# Patient Record
Sex: MALE | Race: WHITE | NOT HISPANIC OR LATINO | Employment: FULL TIME | URBAN - METROPOLITAN AREA
[De-identification: names, ages, dates, MRNs, and addresses within clinical notes are randomized per-mention and may not be internally consistent; named-entity substitution may affect disease eponyms.]

---

## 2018-04-02 ENCOUNTER — APPOINTMENT (OUTPATIENT)
Dept: RADIOLOGY | Facility: MEDICAL CENTER | Age: 73
End: 2018-04-02
Attending: EMERGENCY MEDICINE
Payer: MEDICARE

## 2018-04-02 ENCOUNTER — HOSPITAL ENCOUNTER (EMERGENCY)
Facility: MEDICAL CENTER | Age: 73
End: 2018-04-02
Attending: EMERGENCY MEDICINE
Payer: MEDICARE

## 2018-04-02 VITALS
HEART RATE: 92 BPM | OXYGEN SATURATION: 98 % | WEIGHT: 214.95 LBS | SYSTOLIC BLOOD PRESSURE: 149 MMHG | BODY MASS INDEX: 26.18 KG/M2 | HEIGHT: 76 IN | TEMPERATURE: 99.3 F | RESPIRATION RATE: 18 BRPM | DIASTOLIC BLOOD PRESSURE: 73 MMHG

## 2018-04-02 DIAGNOSIS — J40 BRONCHITIS: ICD-10-CM

## 2018-04-02 LAB
ALBUMIN SERPL BCP-MCNC: 4.4 G/DL (ref 3.2–4.9)
ALBUMIN/GLOB SERPL: 1.4 G/DL
ALP SERPL-CCNC: 52 U/L (ref 30–99)
ALT SERPL-CCNC: 29 U/L (ref 2–50)
ANION GAP SERPL CALC-SCNC: 9 MMOL/L (ref 0–11.9)
AST SERPL-CCNC: 31 U/L (ref 12–45)
BILIRUB SERPL-MCNC: 1.1 MG/DL (ref 0.1–1.5)
BNP SERPL-MCNC: 46 PG/ML (ref 0–100)
BUN SERPL-MCNC: 24 MG/DL (ref 8–22)
CALCIUM SERPL-MCNC: 9.6 MG/DL (ref 8.4–10.2)
CHLORIDE SERPL-SCNC: 104 MMOL/L (ref 96–112)
CO2 SERPL-SCNC: 24 MMOL/L (ref 20–33)
CREAT SERPL-MCNC: 1.18 MG/DL (ref 0.5–1.4)
FLUAV RNA SPEC QL NAA+PROBE: NEGATIVE
FLUAV+FLUBV AG SPEC QL IA: NORMAL
FLUBV RNA SPEC QL NAA+PROBE: NEGATIVE
GLOBULIN SER CALC-MCNC: 3.1 G/DL (ref 1.9–3.5)
GLUCOSE SERPL-MCNC: 171 MG/DL (ref 65–99)
INR PPP: 1.1 (ref 0.87–1.13)
LACTATE BLD-SCNC: 0.77 MMOL/L (ref 0.5–2)
POTASSIUM SERPL-SCNC: 4 MMOL/L (ref 3.6–5.5)
PROT SERPL-MCNC: 7.5 G/DL (ref 6–8.2)
PROTHROMBIN TIME: 14.1 SEC (ref 12–14.6)
SIGNIFICANT IND 70042: NORMAL
SITE SITE: NORMAL
SODIUM SERPL-SCNC: 137 MMOL/L (ref 135–145)
SOURCE SOURCE: NORMAL
TROPONIN I SERPL-MCNC: <0.02 NG/ML (ref 0–0.04)

## 2018-04-02 PROCEDURE — 80053 COMPREHEN METABOLIC PANEL: CPT

## 2018-04-02 PROCEDURE — 36415 COLL VENOUS BLD VENIPUNCTURE: CPT

## 2018-04-02 PROCEDURE — 83880 ASSAY OF NATRIURETIC PEPTIDE: CPT

## 2018-04-02 PROCEDURE — 71045 X-RAY EXAM CHEST 1 VIEW: CPT

## 2018-04-02 PROCEDURE — 85610 PROTHROMBIN TIME: CPT

## 2018-04-02 PROCEDURE — 84484 ASSAY OF TROPONIN QUANT: CPT

## 2018-04-02 PROCEDURE — 87400 INFLUENZA A/B EACH AG IA: CPT

## 2018-04-02 PROCEDURE — 83605 ASSAY OF LACTIC ACID: CPT

## 2018-04-02 PROCEDURE — 87502 INFLUENZA DNA AMP PROBE: CPT

## 2018-04-02 PROCEDURE — 700105 HCHG RX REV CODE 258: Performed by: EMERGENCY MEDICINE

## 2018-04-02 PROCEDURE — 99284 EMERGENCY DEPT VISIT MOD MDM: CPT

## 2018-04-02 RX ORDER — ATORVASTATIN CALCIUM 20 MG/1
20 TABLET, FILM COATED ORAL
COMMUNITY

## 2018-04-02 RX ORDER — ALLOPURINOL 100 MG/1
100 TABLET ORAL 2 TIMES DAILY
COMMUNITY

## 2018-04-02 RX ORDER — GABAPENTIN 300 MG/1
300 CAPSULE ORAL 2 TIMES DAILY
COMMUNITY

## 2018-04-02 RX ORDER — CHLORAL HYDRATE 500 MG
2000 CAPSULE ORAL DAILY
Status: SHIPPED | COMMUNITY
End: 2021-08-17

## 2018-04-02 RX ORDER — IRBESARTAN 300 MG/1
300 TABLET ORAL DAILY
Status: SHIPPED | COMMUNITY
End: 2021-08-17

## 2018-04-02 RX ORDER — SODIUM CHLORIDE 9 MG/ML
1000 INJECTION, SOLUTION INTRAVENOUS ONCE
Status: COMPLETED | OUTPATIENT
Start: 2018-04-02 | End: 2018-04-02

## 2018-04-02 RX ORDER — AZITHROMYCIN 250 MG/1
TABLET, FILM COATED ORAL
Qty: 6 TAB | Refills: 0 | Status: SHIPPED | OUTPATIENT
Start: 2018-04-02 | End: 2019-06-21

## 2018-04-02 RX ORDER — M-VIT,TX,IRON,MINS/CALC/FOLIC 27MG-0.4MG
1 TABLET ORAL DAILY
COMMUNITY

## 2018-04-02 RX ADMIN — SODIUM CHLORIDE 1000 ML: 9 INJECTION, SOLUTION INTRAVENOUS at 11:05

## 2018-04-02 ASSESSMENT — PAIN SCALES - GENERAL
PAINLEVEL_OUTOF10: 0
PAINLEVEL_OUTOF10: 0

## 2018-04-02 NOTE — ED PROVIDER NOTES
"ED Provider Note    CHIEF COMPLAINT  Chief Complaint   Patient presents with   • Cough     since Fri Room mate also ill  Prod yellow to green sputum   • Fever     Low grade       HPI  Khadar Us is a 72 y.o. male who presents to the emergency department with a cough. The patient's been sick since Friday. He states his roommate is ill with a similar illness. The patient's had low-grade fevers as well as a productive cough. He's also had periodic fevers and chills. He has not had any vomiting. He does have some generalized malaise. He also has some congestion.    REVIEW OF SYSTEMS  See HPI for further details. All other systems are negative.     PAST MEDICAL HISTORY  Past Medical History:   Diagnosis Date   • Skull fracture (CMS-[facility])        SOCIAL HISTORY  Social History     Social History   • Marital status: Single     Spouse name: N/A   • Number of children: N/A   • Years of education: N/A     Social History Main Topics   • Smoking status: Former Smoker   • Smokeless tobacco: Never Used   • Alcohol use Yes      Comment: 1 beer /day   • Drug use: No   • Sexual activity: Not on file     Other Topics Concern   • Not on file     Social History Narrative   • No narrative on file           PHYSICAL EXAM  VITAL SIGNS: /73   Pulse 90   Temp 37.4 °C (99.3 °F)   Resp 13   Ht 1.93 m (6' 4\")   Wt 97.5 kg (214 lb 15.2 oz)   SpO2 96%   BMI 26.16 kg/m²   Constitutional: Well developed, Well nourished, No acute distress, Non-toxic appearance.   HENT: Normocephalic, Atraumatic, tympanic membranes are intact and nonerythematous bilaterally, Oropharynx moist without exudates or erythema, Nose swollen turbinates  Eyes: PERRLA, EOMI, Conjunctiva normal.  Neck: Supple without meningismus  Lymphatic: No lymphadenopathy noted.   Cardiovascular: Normal heart rate, Normal rhythm, No murmurs, No rubs, No gallops.   Thorax & Lungs: Mild diffuse rhonchi, No respiratory distress, No wheezing, No chest tenderness. "   Abdomen: Bowel sounds normal, Soft, No tenderness, no rebound, no guarding, no distention, No masses, No pulsatile masses.   Skin: Warm, Dry, No erythema, No rash.   Back: No tenderness, No CVA tenderness.   Extremities: Atraumatic with symmetric distal pulses, No edema, No tenderness, No cyanosis, No clubbing.   Neurologic: Alert & oriented x 3, cranial nerves II through XII are intact, Normal motor function, Normal sensory function, No focal deficits noted.   Psychiatric: Affect normal, Judgment normal, Mood normal.     RADIOLOGY/PROCEDURES  DX-CHEST-PORTABLE (1 VIEW)   Final Result      No evidence of acute cardiopulmonary process.            COURSE & MEDICAL DECISION MAKING  Pertinent Labs & Imaging studies reviewed. (See chart for details)  This a 72-year-old gentleman who presents the first part with suspected acute bronchitis. Of note the patient was initially triaged by my partner. Due to the diarrhea there concern for possible dehydration the patient did receive intravenous fluids. This will help with elevation and his BUN which does support mild dehydration. The chest x-ray does not show any evidence for focal process such as pneumonia. The patient does not have a lactic acidosis. The patient's platelets quite related and therefore they asked for a redraw on the CBC however due to the patient's appearance and the rest of his blood work I canceled the patient's CBC is a will not change our treatment regimen. The patient will be discharged home on azithromycin for possible bronchitis as he does have a significant productive cough. He is aware this is most likely from a viral process and needs to drink lots of fluids as well as get some rest. The patient return if he is acutely worse or does not have significant improvement in 48-72 hours.    FINAL IMPRESSION  1. Acute bronchitis  2. Viral gastroenteritis   3. Mild dehydration     Disposition  The patient will be discharged in stable  condition    Electronically signed by: Eliot Godinez, 4/2/2018 3:07 PM

## 2018-04-02 NOTE — ED NOTES
1045 Pt assessed P.x-ray done  1050 NSL initiated Blood drawn and to lab  1055 Flu swab sent POC reviewed Awaiting results

## 2018-04-02 NOTE — ED NOTES
Patient R/A and D/C home by ERP with prescription for abx. Patient to return to ER if symptoms worsen

## 2018-04-02 NOTE — DISCHARGE INSTRUCTIONS

## 2018-04-02 NOTE — ED NOTES
Med rec updated and complete  Allergies reviewed  Pt reports that he can't remember all his prescription medications.  Called [facility] @ [phone] to verify all of pts medications.  Went back asked pt last time he took his medications.  Pt reports no antibiotics in the last 30 days.

## 2018-04-02 NOTE — ED NOTES
Patient ambulating to washroom without assistance. No concerns at the moment. Will continue to monitor

## 2018-04-03 ENCOUNTER — PATIENT OUTREACH (OUTPATIENT)
Dept: HEALTH INFORMATION MANAGEMENT | Facility: OTHER | Age: 73
End: 2018-04-03

## 2018-09-27 ENCOUNTER — HOSPITAL ENCOUNTER (EMERGENCY)
Facility: MEDICAL CENTER | Age: 73
End: 2018-09-27
Attending: EMERGENCY MEDICINE
Payer: MEDICARE

## 2018-09-27 ENCOUNTER — APPOINTMENT (OUTPATIENT)
Dept: RADIOLOGY | Facility: MEDICAL CENTER | Age: 73
End: 2018-09-27
Payer: MEDICARE

## 2018-09-27 ENCOUNTER — APPOINTMENT (OUTPATIENT)
Dept: RADIOLOGY | Facility: MEDICAL CENTER | Age: 73
End: 2018-09-27
Attending: EMERGENCY MEDICINE
Payer: MEDICARE

## 2018-09-27 VITALS
HEART RATE: 85 BPM | WEIGHT: 211.2 LBS | BODY MASS INDEX: 25.72 KG/M2 | SYSTOLIC BLOOD PRESSURE: 132 MMHG | RESPIRATION RATE: 16 BRPM | TEMPERATURE: 97.4 F | DIASTOLIC BLOOD PRESSURE: 79 MMHG | OXYGEN SATURATION: 96 % | HEIGHT: 76 IN

## 2018-09-27 DIAGNOSIS — R07.9 CHEST PAIN, UNSPECIFIED TYPE: ICD-10-CM

## 2018-09-27 LAB
ALBUMIN SERPL BCP-MCNC: 4.4 G/DL (ref 3.2–4.9)
ALBUMIN/GLOB SERPL: 1.4 G/DL
ALP SERPL-CCNC: 161 U/L (ref 30–99)
ALT SERPL-CCNC: 284 U/L (ref 2–50)
ANION GAP SERPL CALC-SCNC: 8 MMOL/L (ref 0–11.9)
APTT PPP: 34.3 SEC (ref 24.7–36)
AST SERPL-CCNC: 179 U/L (ref 12–45)
BASOPHILS # BLD AUTO: 1.1 % (ref 0–1.8)
BASOPHILS # BLD: 0.08 K/UL (ref 0–0.12)
BILIRUB SERPL-MCNC: 1 MG/DL (ref 0.1–1.5)
BNP SERPL-MCNC: 16 PG/ML (ref 0–100)
BUN SERPL-MCNC: 18 MG/DL (ref 8–22)
CALCIUM SERPL-MCNC: 9.4 MG/DL (ref 8.4–10.2)
CHLORIDE SERPL-SCNC: 101 MMOL/L (ref 96–112)
CO2 SERPL-SCNC: 24 MMOL/L (ref 20–33)
CREAT SERPL-MCNC: 1.07 MG/DL (ref 0.5–1.4)
D DIMER PPP IA.FEU-MCNC: 0.45 UG/ML (FEU) (ref 0–0.5)
EKG IMPRESSION: NORMAL
EOSINOPHIL # BLD AUTO: 0.42 K/UL (ref 0–0.51)
EOSINOPHIL NFR BLD: 6 % (ref 0–6.9)
ERYTHROCYTE [DISTWIDTH] IN BLOOD BY AUTOMATED COUNT: 46.2 FL (ref 35.9–50)
GLOBULIN SER CALC-MCNC: 3.1 G/DL (ref 1.9–3.5)
GLUCOSE SERPL-MCNC: 110 MG/DL (ref 65–99)
HCT VFR BLD AUTO: 40 % (ref 42–52)
HGB BLD-MCNC: 13.8 G/DL (ref 14–18)
IMM GRANULOCYTES # BLD AUTO: 0.01 K/UL (ref 0–0.11)
IMM GRANULOCYTES NFR BLD AUTO: 0.1 % (ref 0–0.9)
INR PPP: 1.04 (ref 0.87–1.13)
LIPASE SERPL-CCNC: 25 U/L (ref 7–58)
LYMPHOCYTES # BLD AUTO: 1.74 K/UL (ref 1–4.8)
LYMPHOCYTES NFR BLD: 24.9 % (ref 22–41)
MCH RBC QN AUTO: 34.6 PG (ref 27–33)
MCHC RBC AUTO-ENTMCNC: 34.5 G/DL (ref 33.7–35.3)
MCV RBC AUTO: 100.3 FL (ref 81.4–97.8)
MONOCYTES # BLD AUTO: 0.72 K/UL (ref 0–0.85)
MONOCYTES NFR BLD AUTO: 10.3 % (ref 0–13.4)
NEUTROPHILS # BLD AUTO: 4.02 K/UL (ref 1.82–7.42)
NEUTROPHILS NFR BLD: 57.6 % (ref 44–72)
NRBC # BLD AUTO: 0 K/UL
NRBC BLD-RTO: 0 /100 WBC
PLATELET # BLD AUTO: 67 K/UL (ref 164–446)
PMV BLD AUTO: 11 FL (ref 9–12.9)
POTASSIUM SERPL-SCNC: 3.9 MMOL/L (ref 3.6–5.5)
PROT SERPL-MCNC: 7.5 G/DL (ref 6–8.2)
PROTHROMBIN TIME: 13.5 SEC (ref 12–14.6)
RBC # BLD AUTO: 3.99 M/UL (ref 4.7–6.1)
SODIUM SERPL-SCNC: 133 MMOL/L (ref 135–145)
TROPONIN I SERPL-MCNC: <0.02 NG/ML (ref 0–0.04)
WBC # BLD AUTO: 7 K/UL (ref 4.8–10.8)

## 2018-09-27 PROCEDURE — 85025 COMPLETE CBC W/AUTO DIFF WBC: CPT

## 2018-09-27 PROCEDURE — 71045 X-RAY EXAM CHEST 1 VIEW: CPT

## 2018-09-27 PROCEDURE — 36415 COLL VENOUS BLD VENIPUNCTURE: CPT

## 2018-09-27 PROCEDURE — 80053 COMPREHEN METABOLIC PANEL: CPT

## 2018-09-27 PROCEDURE — 85730 THROMBOPLASTIN TIME PARTIAL: CPT

## 2018-09-27 PROCEDURE — 85379 FIBRIN DEGRADATION QUANT: CPT

## 2018-09-27 PROCEDURE — 93005 ELECTROCARDIOGRAM TRACING: CPT

## 2018-09-27 PROCEDURE — 83690 ASSAY OF LIPASE: CPT

## 2018-09-27 PROCEDURE — 99285 EMERGENCY DEPT VISIT HI MDM: CPT

## 2018-09-27 PROCEDURE — 85610 PROTHROMBIN TIME: CPT

## 2018-09-27 PROCEDURE — 84484 ASSAY OF TROPONIN QUANT: CPT

## 2018-09-27 PROCEDURE — 83880 ASSAY OF NATRIURETIC PEPTIDE: CPT

## 2018-09-27 PROCEDURE — 93005 ELECTROCARDIOGRAM TRACING: CPT | Performed by: EMERGENCY MEDICINE

## 2018-09-27 RX ORDER — METHYLPREDNISOLONE 4 MG/1
TABLET ORAL
Qty: 1 KIT | Refills: 0 | Status: SHIPPED | OUTPATIENT
Start: 2018-09-27 | End: 2019-06-21

## 2018-09-27 RX ORDER — OMEPRAZOLE 20 MG/1
20 CAPSULE, DELAYED RELEASE ORAL DAILY
Qty: 20 CAP | Refills: 0 | Status: SHIPPED | OUTPATIENT
Start: 2018-09-27 | End: 2019-06-21

## 2018-09-27 ASSESSMENT — PAIN DESCRIPTION - DESCRIPTORS: DESCRIPTORS: SHARP

## 2018-09-27 ASSESSMENT — PAIN SCALES - GENERAL: PAINLEVEL_OUTOF10: 5

## 2018-09-27 NOTE — ED TRIAGE NOTES
"Chief Complaint   Patient presents with   • Chest Pain     x 2 weeks   • Arm Pain     Left   • Nausea     Pt reports c/o CP and Left arm pain x 2 weeks, reports pain \"just won't go away and seems to be getting worse.\"    /70   Pulse 92   Temp 36.4 °C (97.5 °F)   Resp 16   Ht 1.93 m (6' 4\")   Wt 95.8 kg (211 lb 3.2 oz)   SpO2 99%   BMI 25.71 kg/m²     "

## 2018-09-27 NOTE — ED NOTES
Pt given written and oral discharge instructions. Pt verbalized understanding of all instructions given. All questions answered. IV removed. VSS. Pt given paper prescription as ordered and educated on use and side effects. Pt given f/u instructions and educated on s/s of when to return to the ER. Pt ambulating independently upon time of discharge in good condition.

## 2018-09-27 NOTE — ED PROVIDER NOTES
ED Provider Note    CHIEF COMPLAINT  Chest and shoulder pain.    HPI   Khadar Us is a 72 y.o. male who presents complaining of chest pain.  For the last 2 weeks he has had a progressively worsening discomfort in his left chest/shoulder/back of his arm.  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  Since then he has been having worsening symptoms.  He describes a pain over his left neck, he points to his trapezius.  The pain comes to his shoulder, as well as to his chest on the armpit, and over the left posterior arm.  He has a numbing sensation associated with this.  It does hurt to move his shoulder around.  He has had neck issues in the past and is required it sounds steroid injections as well as therapy and chiropractic intervention.  He has not had that for quite some time.  He denies any fever.  There is no weakness or numbness in the hand.  He does not describe any tearing sensation or pleuritic component.  He denies any belly pain.  No change in bowel or bladder.  Patient reports no exertional symptoms; he is very physical with his activities.  There is no other complaint.    PAST MEDICAL HISTORY  Past Medical History:   Diagnosis Date   • Gout    • Heart murmur    • Skull fracture (HCC)        FAMILY HISTORY  History reviewed. No pertinent family history.    SOCIAL HISTORY  Social History   Substance Use Topics   • Smoking status: Former Smoker   • Smokeless tobacco: Never Used   • Alcohol use Yes         SURGICAL HISTORY  Past Surgical History:   Procedure Laterality Date   • HEMORRHOIDECTOMY     • OTHER      hemorhoidectony x 2   • OTHER ORTHOPEDIC SURGERY      [facility]       CURRENT MEDICATIONS    I have reviewed the nurses notes and/or the list brought with the patient.    ALLERGIES  Allergies   Allergen Reactions   • Contrast Media With Iodine [Iodine] Anaphylaxis   • Sulfa  "Drugs Unspecified     \"Does not work, make the infection worst\".         REVIEW OF SYSTEMS  See HPI for further details. Review of systems as above, otherwise all other systems are negative.     PHYSICAL EXAM  VITAL SIGNS: /70   Pulse 82   Temp 36.4 °C (97.5 °F)   Resp 16   Ht 1.93 m (6' 4\")   Wt 95.8 kg (211 lb 3.2 oz)   SpO2 95%   BMI 25.71 kg/m²     Constitutional: Well appearing patient in no acute distress.  Not toxic, nor ill in appearance.  HENT: Mucus membranes moist.  Oropharynx is clear.  Eyes: Pupils equally round.  No scleral icterus.   Neck: Full nontender range of motion.  There is no midline tenderness.  I cannot elicit any radicular symptoms with percussion.  Lymphatic: No cervical lymphadenopathy noted.   Cardiovascular: Regular heart rate and rhythm.  No murmurs, rubs, nor gallop appreciated.   Thorax & Lungs: Chest is nontender.  Lungs are clear to auscultation with good air movement bilaterally.  No wheeze, rhonchi, nor rales.   Abdomen: Soft, with no tenderness, rebound nor guarding.  No mass, pulsatile mass, nor hepatosplenomegaly appreciated.  Skin: No purpura nor petechia noted.  Extremities/Musculoskeletal: No sign of trauma.  Calves are nontender with no cords nor edema.  No Jonny's sign.  Pulses are intact all around.   Neurologic: Alert & oriented.  Strength and sensation is intact all around.  Gait is normal.  Psychiatric: Normal affect appropriate for the clinical situation.    EKG  I interpreted this EKG myself.  This is a 12-lead study.  The rhythm is sinus with a rate of 83.  There are no ST segment nor T wave abnormalities.  Interpretation: No ST segment elevation myocardial infarction.    LABS  Labs Reviewed   CBC WITH DIFFERENTIAL - Abnormal; Notable for the following:        Result Value    RBC 3.99 (*)     Hemoglobin 13.8 (*)     Hematocrit 40.0 (*)     .3 (*)     MCH 34.6 (*)     Platelet Count 67 (*)     All other components within normal limits    " Narrative:     Indicate which anticoagulants the patient is on:->UNKNOWN   COMP METABOLIC PANEL - Abnormal; Notable for the following:     Sodium 133 (*)     Glucose 110 (*)     AST(SGOT) 179 (*)     ALT(SGPT) 284 (*)     Alkaline Phosphatase 161 (*)     All other components within normal limits    Narrative:     Indicate which anticoagulants the patient is on:->UNKNOWN   TROPONIN    Narrative:     Indicate which anticoagulants the patient is on:->UNKNOWN   BTYPE NATRIURETIC PEPTIDE    Narrative:     Indicate which anticoagulants the patient is on:->UNKNOWN   PROTHROMBIN TIME    Narrative:     Indicate which anticoagulants the patient is on:->UNKNOWN   APTT    Narrative:     Indicate which anticoagulants the patient is on:->UNKNOWN   LIPASE    Narrative:     Indicate which anticoagulants the patient is on:->UNKNOWN   ESTIMATED GFR    Narrative:     Indicate which anticoagulants the patient is on:->UNKNOWN   D-DIMER         RADIOLOGY/PROCEDURES  I have reviewed the patient's film interpretations myself, and they are read out by the radiologist as:   DX-CHEST-LIMITED (1 VIEW)   Final Result      1.  No acute cardiac or pulmonary abnormalities are identified.   2.  Atherosclerosis        .    MEDICAL RECORD  I have reviewed patient's medical record and pertinent results are listed above.    COURSE & MEDICAL DECISION MAKING  I have reviewed any medical record information, laboratory studies and radiographic results as noted above.  This patient presents with pain in his left shoulder.  Obviously consideration for cardiac etiology.  However, the quality of his symptoms does not sound cardiac.  In with 2 weeks of symptoms and a negative troponin, I feel that we can exclude this.  Further there is no exertional symptoms.  I have a low suspicion for pulmonary embolism.  Initially had ordered a PE study to evaluate for structural abnormalities, however he has what sounds to be rather severe contrast allergy.  Because of  this, I will rely instead on a chest x-ray and a d-dimer.  The chest x-ray shows no obvious abnormalities aside from atherosclerosis.  He does have a negative d-dimer; my low pretest probability, I feel that we can exclude this.  I do wonder if this represents radicular symptoms coming from the cervical spine.  At this point we will go ahead and put him on Medrol, Prilosec while doing so.  I have asked him to follow-up with his personal doctor as soon as possible.  He apparently has a new physician to whom he has been assigned but is not yet had an appointment.  I have asked him to call and let them know I want him to be seen as soon as possible.  I also point out that he is liver function tests are higher than they were in April with his last check.  I would advise him to follow-up with this as well for recheck.  Appropriate discharge instructions.    FINAL IMPRESSION  1. Chest pain, unspecified type    2.  Suspect cervical radiculopathy       This dictation was created using voice recognition software.    Electronically signed by: Fredrick Disla, 9/27/2018 1:22 PM

## 2019-06-21 ENCOUNTER — HOSPITAL ENCOUNTER (EMERGENCY)
Facility: MEDICAL CENTER | Age: 74
End: 2019-06-21
Attending: EMERGENCY MEDICINE
Payer: MEDICARE

## 2019-06-21 VITALS
SYSTOLIC BLOOD PRESSURE: 172 MMHG | TEMPERATURE: 98 F | DIASTOLIC BLOOD PRESSURE: 84 MMHG | RESPIRATION RATE: 18 BRPM | HEART RATE: 74 BPM | WEIGHT: 211.64 LBS | BODY MASS INDEX: 26.32 KG/M2 | OXYGEN SATURATION: 94 % | HEIGHT: 75 IN

## 2019-06-21 DIAGNOSIS — R33.9 URINARY RETENTION: ICD-10-CM

## 2019-06-21 LAB
ANION GAP SERPL CALC-SCNC: 11 MMOL/L (ref 0–11.9)
APPEARANCE UR: CLEAR
BASOPHILS # BLD AUTO: 0.6 % (ref 0–1.8)
BASOPHILS # BLD: 0.05 K/UL (ref 0–0.12)
BILIRUB UR QL STRIP.AUTO: NEGATIVE
BUN SERPL-MCNC: 20 MG/DL (ref 8–22)
CALCIUM SERPL-MCNC: 9.2 MG/DL (ref 8.4–10.2)
CHLORIDE SERPL-SCNC: 104 MMOL/L (ref 96–112)
CO2 SERPL-SCNC: 24 MMOL/L (ref 20–33)
COLOR UR: YELLOW
CREAT SERPL-MCNC: 1.1 MG/DL (ref 0.5–1.4)
EOSINOPHIL # BLD AUTO: 0.02 K/UL (ref 0–0.51)
EOSINOPHIL NFR BLD: 0.2 % (ref 0–6.9)
ERYTHROCYTE [DISTWIDTH] IN BLOOD BY AUTOMATED COUNT: 43.8 FL (ref 35.9–50)
GLUCOSE SERPL-MCNC: 119 MG/DL (ref 65–99)
GLUCOSE UR STRIP.AUTO-MCNC: NEGATIVE MG/DL
HCT VFR BLD AUTO: 41.5 % (ref 42–52)
HGB BLD-MCNC: 14.1 G/DL (ref 14–18)
IMM GRANULOCYTES # BLD AUTO: 0.01 K/UL (ref 0–0.11)
IMM GRANULOCYTES NFR BLD AUTO: 0.1 % (ref 0–0.9)
KETONES UR STRIP.AUTO-MCNC: NEGATIVE MG/DL
LEUKOCYTE ESTERASE UR QL STRIP.AUTO: NEGATIVE
LYMPHOCYTES # BLD AUTO: 1 K/UL (ref 1–4.8)
LYMPHOCYTES NFR BLD: 11.3 % (ref 22–41)
MCH RBC QN AUTO: 33.2 PG (ref 27–33)
MCHC RBC AUTO-ENTMCNC: 34 G/DL (ref 33.7–35.3)
MCV RBC AUTO: 97.6 FL (ref 81.4–97.8)
MICRO URNS: NORMAL
MONOCYTES # BLD AUTO: 0.62 K/UL (ref 0–0.85)
MONOCYTES NFR BLD AUTO: 7 % (ref 0–13.4)
NEUTROPHILS # BLD AUTO: 7.18 K/UL (ref 1.82–7.42)
NEUTROPHILS NFR BLD: 80.8 % (ref 44–72)
NITRITE UR QL STRIP.AUTO: NEGATIVE
NRBC # BLD AUTO: 0 K/UL
NRBC BLD-RTO: 0 /100 WBC
PH UR STRIP.AUTO: 5 [PH]
PLATELET # BLD AUTO: 117 K/UL (ref 164–446)
PMV BLD AUTO: 10.3 FL (ref 9–12.9)
POTASSIUM SERPL-SCNC: 4 MMOL/L (ref 3.6–5.5)
PROT UR QL STRIP: NEGATIVE MG/DL
RBC # BLD AUTO: 4.25 M/UL (ref 4.7–6.1)
RBC UR QL AUTO: NEGATIVE
SODIUM SERPL-SCNC: 139 MMOL/L (ref 135–145)
SP GR UR STRIP.AUTO: 1.01
WBC # BLD AUTO: 8.9 K/UL (ref 4.8–10.8)

## 2019-06-21 PROCEDURE — 99283 EMERGENCY DEPT VISIT LOW MDM: CPT

## 2019-06-21 PROCEDURE — 80048 BASIC METABOLIC PNL TOTAL CA: CPT

## 2019-06-21 PROCEDURE — 81003 URINALYSIS AUTO W/O SCOPE: CPT

## 2019-06-21 PROCEDURE — 85025 COMPLETE CBC W/AUTO DIFF WBC: CPT

## 2019-06-21 PROCEDURE — 36415 COLL VENOUS BLD VENIPUNCTURE: CPT

## 2019-06-21 RX ORDER — TURMERIC ROOT EXTRACT 500 MG
2 TABLET ORAL EVERY MORNING
COMMUNITY

## 2019-06-21 RX ORDER — TAMSULOSIN HYDROCHLORIDE 0.4 MG/1
0.4 CAPSULE ORAL DAILY
Qty: 14 CAP | Refills: 0 | Status: SHIPPED | OUTPATIENT
Start: 2019-06-21 | End: 2019-07-05

## 2019-06-21 NOTE — ED NOTES
Rounding complete. Explained to pt that he will be placed in ED when a room becomes available. Pt verbalized understanding.

## 2019-06-21 NOTE — ED PROVIDER NOTES
"ED Provider Note    CHIEF COMPLAINT  Chief Complaint   Patient presents with   • Unable to Urinate       HPI  Khadar Us is a 73 y.o. male who presents with urinary retention.  It started about 2 AM.  He states he was able to \"drizzle\" but unable to get a full stream.  Felt like he never could empty his bladder.  He has a history of prostate cancer that he elected to monitor and did not have any surgical interventions.  He states that nonaggressive type and they have just been doing yearly prostate exams to monitor it.  He denies any pain with urination or hematuria.  He denies any new medications.  He denies any abdominal pain.  He sees his urologist next week for his yearly checkup.  He states since he is arrived in the emergency department he is actually been able to urinate 3 times in the last time he was able to get a good stream of urine.  He no longer has any abdominal distention or pain.    REVIEW OF SYSTEMS  See HPI for further details. All other systems are negative.     PAST MEDICAL HISTORY  Past Medical History:   Diagnosis Date   • Gout    • Heart murmur    • Skull fracture (HCC)        FAMILY HISTORY  [unfilled]    SOCIAL HISTORY  Social History     Social History   • Marital status: Single     Spouse name: N/A   • Number of children: N/A   • Years of education: N/A     Social History Main Topics   • Smoking status: Former Smoker   • Smokeless tobacco: Never Used   • Alcohol use Yes      Comment: Occasionally   • Drug use: No   • Sexual activity: Not on file     Other Topics Concern   • Not on file     Social History Narrative   • No narrative on file       SURGICAL HISTORY  Past Surgical History:   Procedure Laterality Date   • HEMORRHOIDECTOMY     • OTHER      hemorhoidectony x 2   • OTHER ORTHOPEDIC SURGERY      [facility]       CURRENT MEDICATIONS  Home Medications     Reviewed by Amari Ferguson (Pharmacy Tech) on 06/21/19 at 1611  Med List Status: Complete   Medication Last Dose " "Status   allopurinol (ZYLOPRIM) 100 MG Tab 6/20/2019 Active   APPLE CIDER VINEGAR PO 6/21/2019 Active   atorvastatin (LIPITOR) 20 MG Tab 6/20/2019 Active   gabapentin (NEURONTIN) 300 MG Cap 6/21/2019 Active   irbesartan (AVAPRO) 300 MG Tab 6/21/2019 Active   MAGNESIUM PO 6/21/2019 Active   Omega-3 Fatty Acids (FISH OIL) 1000 MG Cap capsule 6/21/2019 Active   therapeutic multivitamin-minerals (THERAGRAN-M) Tab 6/21/2019 Active   TURMERIC PO 6/21/2019 Active   vitamin D (CHOLECALCIFEROL) 1000 UNIT Tab 6/21/2019 Active                ALLERGIES  Allergies   Allergen Reactions   • Contrast Media With Iodine [Iodine] Anaphylaxis   • Sulfa Drugs Unspecified     \"Does not work, make the infection worst\".         PHYSICAL EXAM  VITAL SIGNS: /85   Pulse 82   Temp 36.4 °C (97.5 °F) (Temporal)   Resp 18   Ht 1.905 m (6' 3\")   Wt 96 kg (211 lb 10.3 oz)   SpO2 92%   BMI 26.45 kg/m²       Constitutional: Well developed, No acute distress, Non-toxic appearance.   HENT: Normocephalic, Atraumatic, Bilateral external ears normal, Oropharynx moist, No oral exudates, Nose normal.   Eyes: PERRL, EOMI, Conjunctiva normal  Neck: Normal range of motion, No tenderness, Supple  Cardiovascular: Normal heart rate, Normal rhythm, 3/6 murmurs.   Thorax & Lungs: Normal breath sounds, No respiratory distress,  Abdomen: Benign abdominal exam, no guarding no rebound, no masses, no pulsatile mass, no tenderness, no distention  Skin: Warm, Dry, No erythema, No rash.   Back: No tenderness, No CVA tenderness.   Extremities: Intact distal pulses, No edema, No tenderness   Neurologic: Alert & oriented x 3, Normal motor function, Normal sensory function, No focal deficits noted.   Psychiatric: appropriate    EKG  Results for orders placed or performed during the hospital encounter of 09/27/18   EKG   Result Value Ref Range    Report       [facility] Emergency Dept.    Test Date:  2018-09-27  Pt Name:    ULIS LEWIS "             Department: [facility]  MRN:        [id number]                      Room:  Gender:     Male                         Technician: [id number]  :        [id number]                   Requested By:ER TRIAGE PROTOCOL  Order #:    [id number]                    Reading MD:    Measurements  Intervals                                Axis  Rate:       83                           P:          0  WY:         160                          QRS:        5  QRSD:       94                           T:          42  QT:         382  QTc:        449    Interpretive Statements  Sinus rhythm  No previous ECG available for comparison         Labs  Results for orders placed or performed during the hospital encounter of 19   URINALYSIS,CULTURE IF INDICATED   Result Value Ref Range    Color Yellow     Character Clear     Specific Gravity 1.015 <1.035    Ph 5.0 5.0 - 8.0    Glucose Negative Negative mg/dL    Ketones Negative Negative mg/dL    Protein Negative Negative mg/dL    Bilirubin Negative Negative    Nitrite Negative Negative    Leukocyte Esterase Negative Negative    Occult Blood Negative Negative    Micro Urine Req see below    CBC WITH DIFFERENTIAL   Result Value Ref Range    WBC 8.9 4.8 - 10.8 K/uL    RBC 4.25 (L) 4.70 - 6.10 M/uL    Hemoglobin 14.1 14.0 - 18.0 g/dL    Hematocrit 41.5 (L) 42.0 - 52.0 %    MCV 97.6 81.4 - 97.8 fL    MCH 33.2 (H) 27.0 - 33.0 pg    MCHC 34.0 33.7 - 35.3 g/dL    RDW 43.8 35.9 - 50.0 fL    Platelet Count 117 (L) 164 - 446 K/uL    MPV 10.3 9.0 - 12.9 fL    Neutrophils-Polys 80.80 (H) 44.00 - 72.00 %    Lymphocytes 11.30 (L) 22.00 - 41.00 %    Monocytes 7.00 0.00 - 13.40 %    Eosinophils 0.20 0.00 - 6.90 %    Basophils 0.60 0.00 - 1.80 %    Immature Granulocytes 0.10 0.00 - 0.90 %    Nucleated RBC 0.00 /100 WBC    Neutrophils (Absolute) 7.18 1.82 - 7.42 K/uL    Lymphs (Absolute) 1.00 1.00 - 4.80 K/uL    Monos (Absolute) 0.62 0.00 - 0.85 K/uL    Eos (Absolute) 0.02 0.00 - 0.51 K/uL    Baso (Absolute) 0.05  0.00 - 0.12 K/uL    Immature Granulocytes (abs) 0.01 0.00 - 0.11 K/uL    NRBC (Absolute) 0.00 K/uL   BASIC METABOLIC PANEL   Result Value Ref Range    Sodium 139 135 - 145 mmol/L    Potassium 4.0 3.6 - 5.5 mmol/L    Chloride 104 96 - 112 mmol/L    Co2 24 20 - 33 mmol/L    Glucose 119 (H) 65 - 99 mg/dL    Bun 20 8 - 22 mg/dL    Creatinine 1.10 0.50 - 1.40 mg/dL    Calcium 9.2 8.4 - 10.2 mg/dL    Anion Gap 11.0 0.0 - 11.9   ESTIMATED GFR   Result Value Ref Range    GFR If African American >60 >60 mL/min/1.73 m 2    GFR If Non African American >60 >60 mL/min/1.73 m 2       RADIOLOGY/PROCEDURES  No orders to display       COURSE & MEDICAL DECISION MAKING  Pertinent Labs & Imaging studies reviewed. (See chart for details)  Patient presents with urinary retention.  Upon arrival here however patient has been able to urinate several times.  Bladder scan was done and he had 281 cc in the bladder.  Patient has normal renal function.  Patient has normal white count without evidence of bandemia.  There is no evidence of a urinary tract infection patient has not had any change in medications recently.  Patient does have a history of prostate cancer that has been managed as watchful waiting.  We discussed different treatment options for the patient.  Patient does not want a Arvizu catheter.  With him being able to urinate here I think he is a candidate for trial as an outpatient without arvizu. We had a long discussion about the possibility that he may have to return as his urinary retention could return.  Will place the patient on Flomax.  He supposed to follow-up with his urologist next week for his yearly exam and at that point they can discuss this difficulties urinating.  Patient understands his return precautions.    FINAL IMPRESSION     1. Urinary retention             Electronically signed by: Nova Ribera, 6/21/2019 4:33 PM

## 2019-06-21 NOTE — ED NOTES
"Presents complaining of inability to urinate, worsening since approximately 0200 this AM.  He reports a hx of prostate cancer.   Chief Complaint   Patient presents with   • Unable to Urinate     /85   Pulse 82   Temp 36.4 °C (97.5 °F) (Temporal)   Resp 18   Ht 1.905 m (6' 3\")   Wt 96 kg (211 lb 10.3 oz)   SpO2 92%   BMI 26.45 kg/m²     "

## 2019-06-21 NOTE — ED NOTES
Pt presents to the ER with c/o trouble urinating since 0100 this morning. Pt states he has central lower abdominal pain with urination. Since pt has been here he has gone to the bathroom three times but is still having pain. Pt awaiting ERP. Wife at the bedside. Pt denies any needs at this time.

## 2019-06-22 NOTE — DISCHARGE INSTRUCTIONS
Take the medication as directed.  If you are unable to urinate you must return.  Call tomorrow to schedule appointment with the urologist.  Any fevers back pain or different symptoms he must also return for reevaluation.

## 2019-06-22 NOTE — ED NOTES
Dc instructions and medications discussed with patient and wife at bedside. No IV to remove. VSS. All questions answered at this time. Pt ambulated to lobby with steady gait.

## 2019-09-03 ENCOUNTER — HOSPITAL ENCOUNTER (EMERGENCY)
Facility: MEDICAL CENTER | Age: 74
End: 2019-09-03
Attending: EMERGENCY MEDICINE
Payer: MEDICARE

## 2019-09-03 ENCOUNTER — APPOINTMENT (OUTPATIENT)
Dept: RADIOLOGY | Facility: MEDICAL CENTER | Age: 74
End: 2019-09-03
Attending: EMERGENCY MEDICINE
Payer: MEDICARE

## 2019-09-03 VITALS
DIASTOLIC BLOOD PRESSURE: 72 MMHG | SYSTOLIC BLOOD PRESSURE: 130 MMHG | HEART RATE: 73 BPM | HEIGHT: 74 IN | BODY MASS INDEX: 26.62 KG/M2 | WEIGHT: 207.45 LBS | OXYGEN SATURATION: 92 % | TEMPERATURE: 98.2 F | RESPIRATION RATE: 19 BRPM

## 2019-09-03 DIAGNOSIS — I35.0 AORTIC VALVE STENOSIS, ETIOLOGY OF CARDIAC VALVE DISEASE UNSPECIFIED: ICD-10-CM

## 2019-09-03 DIAGNOSIS — R63.4 WEIGHT LOSS: ICD-10-CM

## 2019-09-03 DIAGNOSIS — N17.9 ACUTE RENAL FAILURE, UNSPECIFIED ACUTE RENAL FAILURE TYPE (HCC): ICD-10-CM

## 2019-09-03 DIAGNOSIS — R07.9 CHEST PAIN, UNSPECIFIED TYPE: ICD-10-CM

## 2019-09-03 DIAGNOSIS — R55 NEAR SYNCOPE: ICD-10-CM

## 2019-09-03 DIAGNOSIS — R79.89 ELEVATED TROPONIN: ICD-10-CM

## 2019-09-03 LAB
ALBUMIN SERPL BCP-MCNC: 4 G/DL (ref 3.2–4.9)
ALBUMIN/GLOB SERPL: 1.3 G/DL
ALP SERPL-CCNC: 60 U/L (ref 30–99)
ALT SERPL-CCNC: 32 U/L (ref 2–50)
ANION GAP SERPL CALC-SCNC: 15 MMOL/L (ref 0–11.9)
APPEARANCE UR: ABNORMAL
AST SERPL-CCNC: 27 U/L (ref 12–45)
BACTERIA #/AREA URNS HPF: ABNORMAL /HPF
BASOPHILS # BLD AUTO: 0.8 % (ref 0–1.8)
BASOPHILS # BLD: 0.05 K/UL (ref 0–0.12)
BILIRUB SERPL-MCNC: 0.9 MG/DL (ref 0.1–1.5)
BILIRUB UR QL STRIP.AUTO: ABNORMAL
BUN SERPL-MCNC: 53 MG/DL (ref 8–22)
CALCIUM SERPL-MCNC: 9.2 MG/DL (ref 8.4–10.2)
CHLORIDE SERPL-SCNC: 100 MMOL/L (ref 96–112)
CK SERPL-CCNC: 242 U/L (ref 0–154)
CO2 SERPL-SCNC: 21 MMOL/L (ref 20–33)
COLOR UR: YELLOW
CREAT SERPL-MCNC: 3.21 MG/DL (ref 0.5–1.4)
EKG IMPRESSION: NORMAL
EOSINOPHIL # BLD AUTO: 0.08 K/UL (ref 0–0.51)
EOSINOPHIL NFR BLD: 1.2 % (ref 0–6.9)
EPI CELLS #/AREA URNS HPF: ABNORMAL /HPF
ERYTHROCYTE [DISTWIDTH] IN BLOOD BY AUTOMATED COUNT: 49.8 FL (ref 35.9–50)
GLOBULIN SER CALC-MCNC: 3.1 G/DL (ref 1.9–3.5)
GLUCOSE SERPL-MCNC: 132 MG/DL (ref 65–99)
GLUCOSE UR STRIP.AUTO-MCNC: NEGATIVE MG/DL
HCT VFR BLD AUTO: 34 % (ref 42–52)
HGB BLD-MCNC: 11.5 G/DL (ref 14–18)
HYALINE CASTS #/AREA URNS LPF: >10 /LPF
IMM GRANULOCYTES # BLD AUTO: 0.02 K/UL (ref 0–0.11)
IMM GRANULOCYTES NFR BLD AUTO: 0.3 % (ref 0–0.9)
KETONES UR STRIP.AUTO-MCNC: ABNORMAL MG/DL
LACTATE BLD-SCNC: 0.8 MMOL/L (ref 0.5–2)
LEUKOCYTE ESTERASE UR QL STRIP.AUTO: NEGATIVE
LIPASE SERPL-CCNC: 34 U/L (ref 7–58)
LYMPHOCYTES # BLD AUTO: 1.28 K/UL (ref 1–4.8)
LYMPHOCYTES NFR BLD: 19.4 % (ref 22–41)
MCH RBC QN AUTO: 33.7 PG (ref 27–33)
MCHC RBC AUTO-ENTMCNC: 33.8 G/DL (ref 33.7–35.3)
MCV RBC AUTO: 99.7 FL (ref 81.4–97.8)
MICRO URNS: ABNORMAL
MONOCYTES # BLD AUTO: 0.88 K/UL (ref 0–0.85)
MONOCYTES NFR BLD AUTO: 13.4 % (ref 0–13.4)
NEUTROPHILS # BLD AUTO: 4.28 K/UL (ref 1.82–7.42)
NEUTROPHILS NFR BLD: 64.9 % (ref 44–72)
NITRITE UR QL STRIP.AUTO: NEGATIVE
NRBC # BLD AUTO: 0 K/UL
NRBC BLD-RTO: 0 /100 WBC
NT-PROBNP SERPL IA-MCNC: 165 PG/ML (ref 0–125)
PH UR STRIP.AUTO: 6 [PH] (ref 5–8)
PLATELET # BLD AUTO: 80 K/UL (ref 164–446)
PMV BLD AUTO: 11.3 FL (ref 9–12.9)
POTASSIUM SERPL-SCNC: 4.5 MMOL/L (ref 3.6–5.5)
PROT SERPL-MCNC: 7.1 G/DL (ref 6–8.2)
PROT UR QL STRIP: NEGATIVE MG/DL
RBC # BLD AUTO: 3.41 M/UL (ref 4.7–6.1)
RBC # URNS HPF: ABNORMAL /HPF
RBC UR QL AUTO: NEGATIVE
SODIUM SERPL-SCNC: 136 MMOL/L (ref 135–145)
SP GR UR REFRACTOMETRY: 1.02
TROPONIN T SERPL-MCNC: 63 NG/L (ref 6–19)
TSH SERPL DL<=0.005 MIU/L-ACNC: 2.45 UIU/ML (ref 0.38–5.33)
WBC # BLD AUTO: 6.6 K/UL (ref 4.8–10.8)
WBC #/AREA URNS HPF: ABNORMAL /HPF

## 2019-09-03 PROCEDURE — 81001 URINALYSIS AUTO W/SCOPE: CPT

## 2019-09-03 PROCEDURE — 99285 EMERGENCY DEPT VISIT HI MDM: CPT

## 2019-09-03 PROCEDURE — 93005 ELECTROCARDIOGRAM TRACING: CPT

## 2019-09-03 PROCEDURE — 87040 BLOOD CULTURE FOR BACTERIA: CPT | Mod: 91

## 2019-09-03 PROCEDURE — 700111 HCHG RX REV CODE 636 W/ 250 OVERRIDE (IP): Performed by: EMERGENCY MEDICINE

## 2019-09-03 PROCEDURE — 84443 ASSAY THYROID STIM HORMONE: CPT

## 2019-09-03 PROCEDURE — 83605 ASSAY OF LACTIC ACID: CPT

## 2019-09-03 PROCEDURE — 84484 ASSAY OF TROPONIN QUANT: CPT

## 2019-09-03 PROCEDURE — A9270 NON-COVERED ITEM OR SERVICE: HCPCS | Performed by: EMERGENCY MEDICINE

## 2019-09-03 PROCEDURE — 87181 SC STD AGAR DILUTION PER AGT: CPT

## 2019-09-03 PROCEDURE — 700102 HCHG RX REV CODE 250 W/ 637 OVERRIDE(OP): Performed by: EMERGENCY MEDICINE

## 2019-09-03 PROCEDURE — 83880 ASSAY OF NATRIURETIC PEPTIDE: CPT

## 2019-09-03 PROCEDURE — 82550 ASSAY OF CK (CPK): CPT

## 2019-09-03 PROCEDURE — 71045 X-RAY EXAM CHEST 1 VIEW: CPT

## 2019-09-03 PROCEDURE — 96365 THER/PROPH/DIAG IV INF INIT: CPT

## 2019-09-03 PROCEDURE — 85025 COMPLETE CBC W/AUTO DIFF WBC: CPT

## 2019-09-03 PROCEDURE — 36415 COLL VENOUS BLD VENIPUNCTURE: CPT

## 2019-09-03 PROCEDURE — 93005 ELECTROCARDIOGRAM TRACING: CPT | Performed by: EMERGENCY MEDICINE

## 2019-09-03 PROCEDURE — 70450 CT HEAD/BRAIN W/O DYE: CPT

## 2019-09-03 PROCEDURE — 83690 ASSAY OF LIPASE: CPT

## 2019-09-03 PROCEDURE — 80053 COMPREHEN METABOLIC PANEL: CPT

## 2019-09-03 PROCEDURE — 700105 HCHG RX REV CODE 258: Performed by: EMERGENCY MEDICINE

## 2019-09-03 RX ORDER — NAPROXEN SODIUM 220 MG
440 TABLET ORAL
Status: SHIPPED | COMMUNITY
End: 2021-08-17

## 2019-09-03 RX ORDER — SODIUM CHLORIDE 9 MG/ML
1000 INJECTION, SOLUTION INTRAVENOUS ONCE
Status: COMPLETED | OUTPATIENT
Start: 2019-09-03 | End: 2019-09-03

## 2019-09-03 RX ORDER — ASPIRIN 325 MG
325 TABLET ORAL ONCE
Status: COMPLETED | OUTPATIENT
Start: 2019-09-03 | End: 2019-09-03

## 2019-09-03 RX ADMIN — ASPIRIN 325 MG ORAL TABLET 325 MG: 325 PILL ORAL at 17:32

## 2019-09-03 RX ADMIN — SODIUM CHLORIDE 1000 ML: 9 INJECTION, SOLUTION INTRAVENOUS at 15:29

## 2019-09-03 RX ADMIN — CEFTRIAXONE SODIUM 2 G: 2 INJECTION, POWDER, FOR SOLUTION INTRAMUSCULAR; INTRAVENOUS at 16:22

## 2019-09-03 NOTE — ED NOTES
Bed assignment at [facility] is Rm # [phone], phone number [phone] and nurse for report will be Nieves.

## 2019-09-03 NOTE — ED PROVIDER NOTES
ED Provider Note    CHIEF COMPLAINT  Chief Complaint   Patient presents with   • Weight Loss     for the last 3 month  not trying to loss wt    • Loss Of Balance     when bending over then stands up loss of balance    • Chest Pressure     on and off the last 3 month now its coming more frequently       HPI  Khadar Us is a 73 y.o. male who presents to the emergency department complaining of chest pain, shortness of breath, weakness, and loss of balance.    Patient states he has an unintentional weight loss of about 20 pounds over the last several weeks.  Reports normal intake of food and fluids.  Denies any other acute concerns or complaints.  Is concerned that he is losing weight.  He does have a history of prostate cancer.  States this is been followed.  And is not aggressive.  Denies any other history of malignancy.  Does have a significant history of tobacco use but no cough at this time.    Second complaint is chest pain.  Patient is pain in his chest.  Pain is in the center chest feels like a pressure sensation.  It comes and goes and is associate with achiness in his back and mild dyspnea.  No nausea or vomiting.  Is not necessarily exertional.  He does get very lightheaded if he bends down.  He has a known heart murmur.  He is never had a cardiac stress test or cardiac ultrasound that he can recall.  He denies history of diabetes but has high blood pressure and tobacco use.      Lastly is his complaint of feeling off balance when he feels lightheaded and off-balance when he bends forward.  Has not fallen down.  Denies any focal numbness tingling or weakness.  No vertigo.    REVIEW OF SYSTEMS  See HPI for further details. All other systems are negative.    PAST MEDICAL HISTORY  Past Medical History:   Diagnosis Date   • Gout    • Heart murmur    • Skull fracture (HCC)        FAMILY HISTORY  History reviewed. No pertinent family history.    SOCIAL HISTORY  Social History     Socioeconomic History    • Marital status: Single     Spouse name: Not on file   • Number of children: Not on file   • Years of education: Not on file   • Highest education level: Not on file   Occupational History   • Not on file   Social Needs   • Financial resource strain: Not on file   • Food insecurity:     Worry: Not on file     Inability: Not on file   • Transportation needs:     Medical: Not on file     Non-medical: Not on file   Tobacco Use   • Smoking status: Former Smoker   • Smokeless tobacco: Never Used   Substance and Sexual Activity   • Alcohol use: Yes     Comment: Occasionally   • Drug use: No   • Sexual activity: Not on file   Lifestyle   • Physical activity:     Days per week: Not on file     Minutes per session: Not on file   • Stress: Not on file   Relationships   • Social connections:     Talks on phone: Not on file     Gets together: Not on file     Attends Sikhism service: Not on file     Active member of club or organization: Not on file     Attends meetings of clubs or organizations: Not on file     Relationship status: Not on file   • Intimate partner violence:     Fear of current or ex partner: Not on file     Emotionally abused: Not on file     Physically abused: Not on file     Forced sexual activity: Not on file   Other Topics Concern   • Not on file   Social History Narrative   • Not on file       SURGICAL HISTORY  Past Surgical History:   Procedure Laterality Date   • HEMORRHOIDECTOMY     • OTHER      hemorhoidectony x 2   • OTHER ORTHOPEDIC SURGERY      [facility]       CURRENT MEDICATIONS  Home Medications     Reviewed by Cathy Grayson R.N. (Registered Nurse) on 09/03/19 at 1407  Med List Status: Partial   Medication Last Dose Status   allopurinol (ZYLOPRIM) 100 MG Tab 9/3/2019 Active   APPLE CIDER VINEGAR PO 9/3/2019 Active   atorvastatin (LIPITOR) 20 MG Tab 9/3/2019 Active   gabapentin (NEURONTIN) 300 MG Cap 9/3/2019 Active   irbesartan (AVAPRO) 300 MG Tab 9/2/2019 Active   MAGNESIUM PO  "9/3/2019 Active   Omega-3 Fatty Acids (FISH OIL) 1000 MG Cap capsule 9/3/2019 Active   therapeutic multivitamin-minerals (THERAGRAN-M) Tab 9/3/2019 Active   TURMERIC PO 9/3/2019 Active   vitamin D (CHOLECALCIFEROL) 1000 UNIT Tab 9/3/2019 Active                ALLERGIES  Allergies   Allergen Reactions   • Contrast Media With Iodine [Iodine] Anaphylaxis   • Sulfa Drugs Unspecified     \"Does not work, make the infection worst\".         PHYSICAL EXAM  VITAL SIGNS: /54   Pulse 100   Temp 36.8 °C (98.2 °F) (Temporal)   Resp 18   Ht 1.88 m (6' 2\")   Wt 94.1 kg (207 lb 7.3 oz)   SpO2 92%   BMI 26.64 kg/m²      Constitutional: Well developed, Well nourished, No acute distress, Non-toxic appearance.   HENT: Normocephalic, Atraumatic, Bilateral external ears normal, Oropharynx moist, No oral exudates, Nose normal.   Eyes: PERRL, EOMI, Conjunctiva normal, No discharge.   Neck: Normal range of motion, No tenderness, Supple, No stridor.   Lymphatic: No lymphadenopathy noted.   Cardiovascular: Normal heart rate, Normal rhythm, No murmurs, No rubs, No gallops.   Thorax & Lungs: Normal breath sounds, No respiratory distress, No wheezing, No chest tenderness.   Abdomen: Bowel sounds normal, Soft, No tenderness  Back: No tenderness, No CVA tenderness.   Musculoskeletal: Good range of motion in all major joints.  No asymmetric edema per  Neurologic: Alert,  No focal deficits noted.   Psychiatric: Affect normal      Results for orders placed or performed during the hospital encounter of 09/03/19   CBC WITH DIFFERENTIAL   Result Value Ref Range    WBC 6.6 4.8 - 10.8 K/uL    RBC 3.41 (L) 4.70 - 6.10 M/uL    Hemoglobin 11.5 (L) 14.0 - 18.0 g/dL    Hematocrit 34.0 (L) 42.0 - 52.0 %    MCV 99.7 (H) 81.4 - 97.8 fL    MCH 33.7 (H) 27.0 - 33.0 pg    MCHC 33.8 33.7 - 35.3 g/dL    RDW 49.8 35.9 - 50.0 fL    Platelet Count 80 (L) 164 - 446 K/uL    MPV 11.3 9.0 - 12.9 fL    Neutrophils-Polys 64.90 44.00 - 72.00 %    Lymphocytes 19.40 " (L) 22.00 - 41.00 %    Monocytes 13.40 0.00 - 13.40 %    Eosinophils 1.20 0.00 - 6.90 %    Basophils 0.80 0.00 - 1.80 %    Immature Granulocytes 0.30 0.00 - 0.90 %    Nucleated RBC 0.00 /100 WBC    Neutrophils (Absolute) 4.28 1.82 - 7.42 K/uL    Lymphs (Absolute) 1.28 1.00 - 4.80 K/uL    Monos (Absolute) 0.88 (H) 0.00 - 0.85 K/uL    Eos (Absolute) 0.08 0.00 - 0.51 K/uL    Baso (Absolute) 0.05 0.00 - 0.12 K/uL    Immature Granulocytes (abs) 0.02 0.00 - 0.11 K/uL    NRBC (Absolute) 0.00 K/uL   COMP METABOLIC PANEL   Result Value Ref Range    Sodium 136 135 - 145 mmol/L    Potassium 4.5 3.6 - 5.5 mmol/L    Chloride 100 96 - 112 mmol/L    Co2 21 20 - 33 mmol/L    Anion Gap 15.0 (H) 0.0 - 11.9    Glucose 132 (H) 65 - 99 mg/dL    Bun 53 (H) 8 - 22 mg/dL    Creatinine 3.21 (H) 0.50 - 1.40 mg/dL    Calcium 9.2 8.4 - 10.2 mg/dL    AST(SGOT) 27 12 - 45 U/L    ALT(SGPT) 32 2 - 50 U/L    Alkaline Phosphatase 60 30 - 99 U/L    Total Bilirubin 0.9 0.1 - 1.5 mg/dL    Albumin 4.0 3.2 - 4.9 g/dL    Total Protein 7.1 6.0 - 8.2 g/dL    Globulin 3.1 1.9 - 3.5 g/dL    A-G Ratio 1.3 g/dL   LIPASE   Result Value Ref Range    Lipase 34 7 - 58 U/L   TROPONIN   Result Value Ref Range    Troponin T 63 (H) 6 - 19 ng/L   proBrain Natriuretic Peptide, NT   Result Value Ref Range    NT-proBNP 165 (H) 0 - 125 pg/mL   URINALYSIS CULTURE, IF INDICATED   Result Value Ref Range    Color Yellow     Character Hazy (A)     Ph 6.0 5.0 - 8.0    Glucose Negative Negative mg/dL    Ketones Trace (A) Negative mg/dL    Protein Negative Negative mg/dL    Bilirubin Small (A) Negative    Nitrite Negative Negative    Leukocyte Esterase Negative Negative    Occult Blood Negative Negative    Micro Urine Req Microscopic    TSH   Result Value Ref Range    TSH 2.450 0.380 - 5.330 uIU/mL   ESTIMATED GFR   Result Value Ref Range    GFR If  23 (A) >60 mL/min/1.73 m 2    GFR If Non  19 (A) >60 mL/min/1.73 m 2   LACTIC ACID   Result Value Ref  Range    Lactic Acid 0.8 0.5 - 2.0 mmol/L   CREATINE KINASE   Result Value Ref Range    CPK Total 242 (H) 0 - 154 U/L   REFRACTOMETER SG   Result Value Ref Range    Specific Gravity 1.023    URINE MICROSCOPIC (W/UA)   Result Value Ref Range    WBC 2-5 (A) /hpf    RBC 0-2 (A) /hpf    Bacteria Few (A) None /hpf    Epithelial Cells Few Few /hpf    Hyaline Cast >10 (A) /lpf   EKG   Result Value Ref Range    Report       [facility] Emergency Dept.    Test Date:  2019  Pt Name:    LUIS LEWIS             Department: [facility]  MRN:        [id number]                      Room:  Gender:     Male                         Technician: [id number]  :        1945                   Requested By:ER TRIAGE PROTOCOL  Order #:    [id number]                    Reading MD: TAD GOSS. [facility]    Measurements  Intervals                                Axis  Rate:       87                           P:          -15  OR:         167                          QRS:        -3  QRSD:       91                           T:          14  QT:         349  QTc:        420    Interpretive Statements  Sinus rhythm  Inferior infarct, old  Compared to ECG 2018 11:58:15  Myocardial infarct finding now present    Electronically Signed On 9-3-2019 14:29:25 PDT by TAD GOSS. [facility]          Twelve-lead EKG interpretation  Twelve-lead EKG shows normal sinus rhythm.  Intervals normal axis is -3.  There is no significant hypertrophy.  There is no ST elevation or depression.    Impression: Normal 12-lead EKG    RADIOLOGY/PROCEDURES  CT-HEAD W/O   Final Result         1. No acute intracranial abnormality. No evidence of acute intracranial hemorrhage or mass lesion.               DX-CHEST-PORTABLE (1 VIEW)   Final Result      No evidence of acute cardiopulmonary process.            COURSE & MEDICAL DECISION MAKING  Pertinent Labs & Imaging studies reviewed. (See chart for details)  The patient presents emergency department  complaining of chest pain, weakness dizziness unstable gait sensation when he bends down, and, weight loss.    A broad official diagnosis for the above was considered including ACS, chest wall pain, PE, valvular heart disease to name a few. His blood pressures a little soft and for his lightheadedness I have considered sepsis, anemia, dehydration, renal failure as well.   The patient has history malignancy and this could certainly related to his malignancy.    The patient's chest discomfort is worked up with an EKG.  After work-up given aspirin and this is ordered.  EKG is nonacute.  Troponin is elevated.  He has a loud systolic ejection murmur that sound like aortic stenosis and I wonder if he has critical aortic stenosis he will need to be admitted for cardiac evaluation continued work-up and echocardiogram.      The patient is in acute renal failure with a creatinine of 3-1/2 up from baseline around 1.  Is given fluids.  His blood pressure has improved.  I have ordered blood cultures lactic acid he has no fever.  His urine looks questionable for infection but nothing else is positive for infection therefore the patient is given a single dose of antibiotics I doubt he is septic      He feels off balance and is falling nearly falling at times is a head CT because of history believes it is no mass.  There is no bleed he will be started on aspirin.    The patient's chart is reviewed from previous visit baseline labs.  He was here fairly recently for urinary retention.  Is concerned that his renal failure may be obstructive uropathy therefore did a bladder scan.  There is only about 160 cc of urine.  He is able to pee he has no clinical history suggestive of urinary retention at this time.    The patient is out of network care and cannot be admitted here.  He does have some abnormal lab findings which make it concerning for to transfer him.  That being said his diagnosis of chest pain, elevated troponin and abnormal  cardiac exam suggestive of severe aortic stenosis mean thought to be transferred anyway to a higher level of care.  I made arrangements to transfer to [facility] since that is where his insurance is accepted.    I spoke with the hospitalist at [facility] which is Diego Gerard.  He will see the patient.  She will be transferred via EMS.  He is made aware the plan to transfer in guarded condition     Disposition: Transfer to higher level of care.    FINAL IMPRESSION  1. Chest pain, unspecified type     2. Elevated troponin     3. Aortic valve stenosis, etiology of cardiac valve disease unspecified     4. Acute renal failure, unspecified acute renal failure type (HCC)     5. Weight loss     6. Near syncope         2.   3.         Electronically signed by: Presley Hernández, 9/3/2019 2:26 PM

## 2019-09-03 NOTE — ED TRIAGE NOTES
Pt comes in  C/o for the last 3-4 months of multiple issues  Chest pain on and off however has increased the last couple of weeks  Is losing weight for unknown reason   Having issue w/ balance now when he bends over then straightens up he becomes dizzy   Symptoms area getting worse  Does not have a PCP at this time

## 2019-09-04 NOTE — ED NOTES
LESA arrived to transfer pt to [facility]  Call to JERAD Dean.  Aware pt received ASA.

## 2019-09-04 NOTE — ED NOTES
Report to JERAD Dean at [facility]  Aware LESA expected around 1730, will call when pt en route.

## 2019-09-04 NOTE — ED NOTES
"Pt currently denies c/o CP, abd or back pain, denies feeling short of breath at this time.  Pt states \"I just hope they can figure out what is wrong.\"  Pt able to verbalize understanding to information provided by ERP.    "

## 2019-09-09 LAB
BACTERIA BLD CULT: ABNORMAL
SIGNIFICANT IND 70042: ABNORMAL
SIGNIFICANT IND 70042: ABNORMAL
SITE SITE: ABNORMAL
SITE SITE: ABNORMAL
SOURCE SOURCE: ABNORMAL
SOURCE SOURCE: ABNORMAL

## 2019-10-15 ENCOUNTER — HOSPITAL ENCOUNTER (EMERGENCY)
Facility: MEDICAL CENTER | Age: 74
End: 2019-10-15
Attending: EMERGENCY MEDICINE
Payer: MEDICARE

## 2019-10-15 ENCOUNTER — APPOINTMENT (OUTPATIENT)
Dept: RADIOLOGY | Facility: MEDICAL CENTER | Age: 74
End: 2019-10-15
Attending: EMERGENCY MEDICINE
Payer: MEDICARE

## 2019-10-15 VITALS
HEIGHT: 74 IN | SYSTOLIC BLOOD PRESSURE: 133 MMHG | BODY MASS INDEX: 26.74 KG/M2 | RESPIRATION RATE: 15 BRPM | HEART RATE: 74 BPM | OXYGEN SATURATION: 95 % | WEIGHT: 208.34 LBS | DIASTOLIC BLOOD PRESSURE: 55 MMHG | TEMPERATURE: 96.8 F

## 2019-10-15 LAB
ALBUMIN SERPL BCP-MCNC: 3.7 G/DL (ref 3.2–4.9)
ALBUMIN/GLOB SERPL: 1.2 G/DL
ALP SERPL-CCNC: 55 U/L (ref 30–99)
ALT SERPL-CCNC: 39 U/L (ref 2–50)
ANION GAP SERPL CALC-SCNC: 15 MMOL/L (ref 0–11.9)
AST SERPL-CCNC: 28 U/L (ref 12–45)
BASOPHILS # BLD AUTO: 0.7 % (ref 0–1.8)
BASOPHILS # BLD: 0.05 K/UL (ref 0–0.12)
BILIRUB SERPL-MCNC: 0.5 MG/DL (ref 0.1–1.5)
BUN SERPL-MCNC: 57 MG/DL (ref 8–22)
CALCIUM SERPL-MCNC: 9.2 MG/DL (ref 8.4–10.2)
CHLORIDE SERPL-SCNC: 101 MMOL/L (ref 96–112)
CO2 SERPL-SCNC: 17 MMOL/L (ref 20–33)
CREAT SERPL-MCNC: 3.68 MG/DL (ref 0.5–1.4)
EKG IMPRESSION: NORMAL
EOSINOPHIL # BLD AUTO: 0.07 K/UL (ref 0–0.51)
EOSINOPHIL NFR BLD: 1 % (ref 0–6.9)
ERYTHROCYTE [DISTWIDTH] IN BLOOD BY AUTOMATED COUNT: 46.6 FL (ref 35.9–50)
GLOBULIN SER CALC-MCNC: 3.1 G/DL (ref 1.9–3.5)
GLUCOSE SERPL-MCNC: 121 MG/DL (ref 65–99)
HCT VFR BLD AUTO: 26.7 % (ref 42–52)
HGB BLD-MCNC: 8.8 G/DL (ref 14–18)
IMM GRANULOCYTES # BLD AUTO: 0.03 K/UL (ref 0–0.11)
IMM GRANULOCYTES NFR BLD AUTO: 0.4 % (ref 0–0.9)
LACTATE BLD-SCNC: 1.3 MMOL/L (ref 0.5–2)
LYMPHOCYTES # BLD AUTO: 0.97 K/UL (ref 1–4.8)
LYMPHOCYTES NFR BLD: 13.8 % (ref 22–41)
MCH RBC QN AUTO: 32.8 PG (ref 27–33)
MCHC RBC AUTO-ENTMCNC: 33 G/DL (ref 33.7–35.3)
MCV RBC AUTO: 99.6 FL (ref 81.4–97.8)
MONOCYTES # BLD AUTO: 0.83 K/UL (ref 0–0.85)
MONOCYTES NFR BLD AUTO: 11.8 % (ref 0–13.4)
NEUTROPHILS # BLD AUTO: 5.08 K/UL (ref 1.82–7.42)
NEUTROPHILS NFR BLD: 72.3 % (ref 44–72)
NRBC # BLD AUTO: 0 K/UL
NRBC BLD-RTO: 0 /100 WBC
NT-PROBNP SERPL IA-MCNC: 307 PG/ML (ref 0–125)
PLATELET # BLD AUTO: 80 K/UL (ref 164–446)
PMV BLD AUTO: 11.3 FL (ref 9–12.9)
POTASSIUM SERPL-SCNC: 4.4 MMOL/L (ref 3.6–5.5)
PROT SERPL-MCNC: 6.8 G/DL (ref 6–8.2)
RBC # BLD AUTO: 2.68 M/UL (ref 4.7–6.1)
SODIUM SERPL-SCNC: 133 MMOL/L (ref 135–145)
TROPONIN T SERPL-MCNC: 81 NG/L (ref 6–19)
WBC # BLD AUTO: 7 K/UL (ref 4.8–10.8)

## 2019-10-15 PROCEDURE — 99285 EMERGENCY DEPT VISIT HI MDM: CPT

## 2019-10-15 PROCEDURE — 700105 HCHG RX REV CODE 258: Performed by: EMERGENCY MEDICINE

## 2019-10-15 PROCEDURE — 93005 ELECTROCARDIOGRAM TRACING: CPT

## 2019-10-15 PROCEDURE — 87040 BLOOD CULTURE FOR BACTERIA: CPT

## 2019-10-15 PROCEDURE — 36415 COLL VENOUS BLD VENIPUNCTURE: CPT

## 2019-10-15 PROCEDURE — 700102 HCHG RX REV CODE 250 W/ 637 OVERRIDE(OP): Performed by: EMERGENCY MEDICINE

## 2019-10-15 PROCEDURE — 83880 ASSAY OF NATRIURETIC PEPTIDE: CPT

## 2019-10-15 PROCEDURE — 80053 COMPREHEN METABOLIC PANEL: CPT

## 2019-10-15 PROCEDURE — 83605 ASSAY OF LACTIC ACID: CPT

## 2019-10-15 PROCEDURE — 84484 ASSAY OF TROPONIN QUANT: CPT

## 2019-10-15 PROCEDURE — 71045 X-RAY EXAM CHEST 1 VIEW: CPT

## 2019-10-15 PROCEDURE — A9270 NON-COVERED ITEM OR SERVICE: HCPCS | Performed by: EMERGENCY MEDICINE

## 2019-10-15 PROCEDURE — 93005 ELECTROCARDIOGRAM TRACING: CPT | Performed by: EMERGENCY MEDICINE

## 2019-10-15 PROCEDURE — 85025 COMPLETE CBC W/AUTO DIFF WBC: CPT

## 2019-10-15 PROCEDURE — 700111 HCHG RX REV CODE 636 W/ 250 OVERRIDE (IP): Performed by: EMERGENCY MEDICINE

## 2019-10-15 PROCEDURE — 96374 THER/PROPH/DIAG INJ IV PUSH: CPT

## 2019-10-15 RX ORDER — LORAZEPAM 1 MG/1
0.5 TABLET ORAL ONCE
Status: COMPLETED | OUTPATIENT
Start: 2019-10-15 | End: 2019-10-15

## 2019-10-15 RX ORDER — CEFOTAXIME INJECTION 1 G/1
2000 POWDER, FOR SOLUTION INTRAMUSCULAR; INTRAVENOUS ONCE
Status: DISCONTINUED | OUTPATIENT
Start: 2019-10-15 | End: 2019-10-15

## 2019-10-15 RX ORDER — SODIUM CHLORIDE 9 MG/ML
1000 INJECTION, SOLUTION INTRAVENOUS ONCE
Status: COMPLETED | OUTPATIENT
Start: 2019-10-15 | End: 2019-10-15

## 2019-10-15 RX ADMIN — SODIUM CHLORIDE 1000 ML: 9 INJECTION, SOLUTION INTRAVENOUS at 19:50

## 2019-10-15 RX ADMIN — CEFTRIAXONE SODIUM 2 G: 2 INJECTION, POWDER, FOR SOLUTION INTRAMUSCULAR; INTRAVENOUS at 23:13

## 2019-10-15 RX ADMIN — LORAZEPAM 0.5 MG: 1 TABLET ORAL at 22:29

## 2019-10-16 NOTE — ED NOTES
joan here to take patient to [facility]. Report given to joan. [facility] called to update on patient arrival. Dc with iv in place

## 2019-10-16 NOTE — ED PROVIDER NOTES
"ED Provider Note    CHIEF COMPLAINT  Chief Complaint   Patient presents with   • Dizziness     Pt c/o ongoing dizzyness off and on for 2-3 weeks; went to [facility] about 10 days ago for similar symptoms; pt was worked up and dc home   • Body Aches     Pt c/o \" uppder body aches\" across chest, back and abdomen   • Chills     Pt c/o chills off and on for last 3-4 weeks   • Weakness     Generalized weakness that has been worsening for last 2-3 weeks       HPI  Khadar Us is a 74 y.o. male who presents for evaluation of what appear to be vacillating chronic symptoms consisting of off-and-on dizziness, which is always related to position of standing, and diffuse body aches to back, chest, and abdomen.  Patient also states that he has been having chills for the last month off and on.  Patient states his generalized weakness is what brought him in today and it has been worse over the past 2 to 3 weeks.  He states he has been having more episodes of dizziness at work and felt that he still had something wrong.  Patient notes he was recently seen at [place] and admitted for 3 days.    REVIEW OF SYSTEMS  Constitutional: Chills, generalized weakness, \"losing muscle mass\"  Skin: No rashes, abrasions, lacerations, or pruritus  HEENT: No ear pain, ringing in ears, or decreased hearing. No sore throat, runny nose, sores, trouble swallowing, trouble speaking.  Neck: No neck pain, stiffness, or masses.  Chest: No pain or rashes  Pulm: No shortness of breath, cough, wheezing, stridor, or pain with inspiration/expiration  Gastrointestinal: No nausea, vomiting, constipation, bloating, melena, hematochezia or pain.  Loose stool still noted.  Genitourinary: No dysuria or hematuria  Musculoskeletal: No recent trauma, pain, swelling, weakness  Neurologic: No sensory or motor changes. No confusion or disorientation.  Heme: No bleeding or bruising problems.   Immuno: No hx of recurrent infections      PAST MEDICAL HISTORY   " "has a past medical history of Gout, Heart murmur, and Skull fracture (HCC).    SOCIAL HISTORY  Social History     Tobacco Use   • Smoking status: Former Smoker   • Smokeless tobacco: Never Used   Substance and Sexual Activity   • Alcohol use: Yes     Comment: Occasionally   • Drug use: No   • Sexual activity: Not on file       SURGICAL HISTORY   has a past surgical history that includes other orthopedic surgery; other; and hemorrhoidectomy.    CURRENT MEDICATIONS  Home Medications    **Home medications have not yet been reviewed for this encounter**         ALLERGIES  Allergies   Allergen Reactions   • Contrast Media With Iodine [Iodine] Anaphylaxis   • Sulfa Drugs Unspecified     \"Does not work, make the infection worst\".         PHYSICAL EXAM  VITAL SIGNS: /55   Pulse 74   Temp 36 °C (96.8 °F) (Temporal)   Resp 15   Ht 1.88 m (6' 2\")   Wt 94.5 kg (208 lb 5.4 oz)   SpO2 95%   BMI 26.75 kg/m²    Gen: Alert in no apparent distress.  Calm, conversant  HEENT: No signs of trauma, Bilateral external ears normal, Nose normal. Conjunctiva normal, Non-icteric.  Tacky mucous membrane  Neck:  No tenderness, Supple, No masses  Lymphatic: No cervical lymphadenopathy noted.   Cardiovascular: Regular rate and rhythm, no murmurs.  Capillary refill 4 to 5 seconds all extremities, 2+ distal pulses to all extremities.  Thorax & Lungs: Normal breath sounds, No respiratory distress, No wheezing bilateral chest rise  Abdomen: Bowel sounds normal, Soft, No tenderness, No masses, No pulsatile masses. No Guarding or rebound  Skin: Warm, Dry, No erythema, No rash.   Extremities: Intact distal pulses, No edema  Neurologic: Alert , no facial droop, grossly normal coordination and strength    INITIAL IMPRESSION  Patient arrives for evaluation of fairly nebulous symptoms which are likely related to the anemia and renal insufficiency noted today.  Patient appears dehydrated on initial exam and has a history consistent with this.  He " "has had persistent loose stools however he states they are most better after his treatment for C. difficile.  Patient was originally noted in the [facility] record to have had chronic diarrhea for almost a year before he was diagnosed with C. difficile last month.  He was treated with vancomycin and was recently \"cleared\" by a laboratory evaluation 2 weeks ago.  He notes that he has a \"bad aortic valve\" which is reinforced by his moderate aortic stenosis noted on his recent echo.  There is also notable the patient underwent an exercise stress test on 4 September and was deemed abnormal.  He was then catheterized and there was no angiographically significant atherosclerotic disease noted.  Patient's troponin is noted to be elevated today however he has no overt chest pain.  Given this catheterization and the fact that he has a new onset renal failure, I do not suspect this is an MI and is likely related to decreased clearance and possibly a slow leak from his aortic stenosis.    LABS  Results for orders placed or performed during the hospital encounter of 10/15/19   CBC with Differential   Result Value Ref Range    WBC 7.0 4.8 - 10.8 K/uL    RBC 2.68 (L) 4.70 - 6.10 M/uL    Hemoglobin 8.8 (L) 14.0 - 18.0 g/dL    Hematocrit 26.7 (L) 42.0 - 52.0 %    MCV 99.6 (H) 81.4 - 97.8 fL    MCH 32.8 27.0 - 33.0 pg    MCHC 33.0 (L) 33.7 - 35.3 g/dL    RDW 46.6 35.9 - 50.0 fL    Platelet Count 80 (L) 164 - 446 K/uL    MPV 11.3 9.0 - 12.9 fL    Neutrophils-Polys 72.30 (H) 44.00 - 72.00 %    Lymphocytes 13.80 (L) 22.00 - 41.00 %    Monocytes 11.80 0.00 - 13.40 %    Eosinophils 1.00 0.00 - 6.90 %    Basophils 0.70 0.00 - 1.80 %    Immature Granulocytes 0.40 0.00 - 0.90 %    Nucleated RBC 0.00 /100 WBC    Neutrophils (Absolute) 5.08 1.82 - 7.42 K/uL    Lymphs (Absolute) 0.97 (L) 1.00 - 4.80 K/uL    Monos (Absolute) 0.83 0.00 - 0.85 K/uL    Eos (Absolute) 0.07 0.00 - 0.51 K/uL    Baso (Absolute) 0.05 0.00 - 0.12 K/uL    Immature " Granulocytes (abs) 0.03 0.00 - 0.11 K/uL    NRBC (Absolute) 0.00 K/uL   Complete Metabolic Panel (CMP)   Result Value Ref Range    Sodium 133 (L) 135 - 145 mmol/L    Potassium 4.4 3.6 - 5.5 mmol/L    Chloride 101 96 - 112 mmol/L    Co2 17 (L) 20 - 33 mmol/L    Anion Gap 15.0 (H) 0.0 - 11.9    Glucose 121 (H) 65 - 99 mg/dL    Bun 57 (H) 8 - 22 mg/dL    Creatinine 3.68 (H) 0.50 - 1.40 mg/dL    Calcium 9.2 8.4 - 10.2 mg/dL    AST(SGOT) 28 12 - 45 U/L    ALT(SGPT) 39 2 - 50 U/L    Alkaline Phosphatase 55 30 - 99 U/L    Total Bilirubin 0.5 0.1 - 1.5 mg/dL    Albumin 3.7 3.2 - 4.9 g/dL    Total Protein 6.8 6.0 - 8.2 g/dL    Globulin 3.1 1.9 - 3.5 g/dL    A-G Ratio 1.2 g/dL   Troponin   Result Value Ref Range    Troponin T 81 (H) 6 - 19 ng/L   ESTIMATED GFR   Result Value Ref Range    GFR If  20 (A) >60 mL/min/1.73 m 2    GFR If Non  16 (A) >60 mL/min/1.73 m 2   LACTIC ACID   Result Value Ref Range    Lactic Acid 1.3 0.5 - 2.0 mmol/L   proBrain Natriuretic Peptide, NT   Result Value Ref Range    NT-proBNP 307 (H) 0 - 125 pg/mL   EKG   Result Value Ref Range    Report       [facility] Emergency Dept.    Test Date:  2019-10-15  Pt Name:    LUIS LEWIS             Department: EDS  MRN:        [id number]                      Room:  Gender:     Male                         Technician: [id number]  :        1945                   Requested By:ER TRIAGE PROTOCOL  Order #:    [id number]                    Cristi MD:    Measurements  Intervals                                Axis  Rate:       86                           P:          0  RI:         191                          QRS:        18  QRSD:       84                           T:          28  QT:         360  QTc:        431    Interpretive Statements  PACEMAKER SPIKES OR ARTIFACTS  SINUS RHYTHM  ATRIAL PREMATURE COMPLEX  Compared to ECG 2019 13:50:48  Atrial premature complex(es) now present  Myocardial  infarct finding no longer present         RADIOLOGY  DX-CHEST-PORTABLE (1 VIEW)   Final Result      No radiographic evidence of acute cardiopulmonary process.          Reevaluation   Time:10:36 PM  Vital signs: Noted per nursing note, appears stable  Assessment: Discussed case with the hospitalist at [facility].  Accepted for transfer.  Discussed this with patient.  Patient noted to have shaking chills at this point.    HYDRATION: Based on the patient's presentation of Dehydration the patient was given IV fluids. IV Hydration was used because oral hydration was not adequate alone. Upon recheck following hydration, the patient was stable.    COURSE & MEDICAL DECISION MAKING  Pertinent Labs & Imaging studies reviewed. (See chart for details)  Patient arrives for evaluation of what appears to be generalized symptoms related to his acute renal failure and anemia.  Although the patient has had the renal failure recently and was admitted for this, the etiology was still somewhat unclear upon discharge last month.  Patient symptoms have remained over the past few weeks and he has felt more generalized weakness and fatigue in the past few days.  It is unclear whether the patient is not making blood, destroying it early, or losing it.  Is also possible his kidneys are involved however it was clear the patient would need admission and further treatment and evaluation.  Unfortunately the patient's insurance is accepted at [facility] only and the patient stated understanding of the need for transfer.  Of note, the patient had positive blood cultures last month drawn at this facility on his initial work-up.  Patient was then admitted to [facility] for the same reasons as today but is not clear whether the patient received antibiotic treatment for the strep viridans species noted in both blood culture bottles.  As the patient was having shaking chills, I suspect this is a recurrence of the same problem and treated him  empirically with cefuroxime    FINAL IMPRESSION  1.  Acute renal failure  2.  Dehydration  3.  Symptomatic anemia  4.  History of strep viridans bacteremia  Electronically signed by: Anastacio Joshi, 10/15/2019 6:54 PM

## 2019-10-16 NOTE — ED NOTES
Report called to raffaele MARS at [facility] tele unit. Pt to transfer via [facility] to [facility] tele floor room 486-2.

## 2019-10-16 NOTE — ED TRIAGE NOTES
"Khadar Us 74 y.o. male   Chief Complaint   Patient presents with   • Dizziness     Pt c/o ongoing dizzyness off and on for 2-3 weeks; went to [facility] about 10 days ago for similar symptoms; pt was worked up and dc home   • Body Aches     Pt c/o \" uppder body aches\" across chest, back and abdomen   • Chills     Pt c/o chills off and on for last 3-4 weeks   • Weakness     Generalized weakness that has been worsening for last 2-3 weeks     BP (!) 93/47   Pulse 83   Temp 36 °C (96.8 °F) (Temporal)   Resp 18   Ht 1.88 m (6' 2\")   Wt 94.5 kg (208 lb 5.4 oz)   SpO2 99%   BMI 26.75 kg/m²       Pt was dx with C-diff was over a month ago and is still taking vancomycin. Pt reports he has been cleared of it. Pt states he is awaiting a GI consult as his PCP thinks pt might have a \"small hole\" in his intestines.     Pt returned to lobby and educated on triage process. Advised to notify RN with changes or concerns.     "

## 2019-10-17 NOTE — ED NOTES
ED Positive Culture Follow-up/Notification Note:    Date: 10/17/2019     Patient seen in the ED on 10/15/2019 for dizziness, body aches, and progressive weakness x 2-3 weeks. Pt also reported chills for the last 3-4 weeks. Pt reported no SOB, cough, wheezing, stridor, or pain with inspiration/expiration. Pt was admitted to [facility] ~10 days prior for similar symptoms, he was worked up and discharged after 3 days.     Per review of previous lab results, pt had 2xBC positive for strep viridans from ED visit on 9/3 when the pt presented with similar symptoms and was transferred to [facility]. Pt has moderate aortic stenosis and reported a recent stress test that was deemed abnormal, along with cath procedure that did not identify any atherosclerotic disease.     Discharge Medication List as of 10/15/2019 11:43 PM        Medications given in the ED:  -Ceftriaxone 2 grams IV once  -Lorazepam 0.5mg PO once  -NS 1L IV    Allergies: Contrast media with iodine [iodine] and Sulfa drugs     Vitals:    10/15/19 2126 10/15/19 2141 10/15/19 2156 10/15/19 2303   BP: 121/63 140/63 126/56 133/55   Pulse:   74    Resp:       Temp:       TempSrc:       SpO2:   95%    Weight:       Height:           Final cultures:   Results     Procedure Component Value Units Date/Time    BLOOD CULTURE [[id number]]  (Abnormal) Collected:  10/15/19 2300    Order Status:  Completed Specimen:  Blood from Peripheral Updated:  10/17/19 0510     Significant Indicator POS     Source BLD     Site PERIPHERAL     Culture Result Growth detected by Bactec instrument. 10/17/2019  05:10  Gram Stain: Gram positive cocci: Possible Streptococcus sp.  Blood culture testing and Gram stain, if indicated, are  performed at [facility], [address] [place] [address].  Positive blood cultures are  sent to [facility], [address] [place] [address], for organism identification and  susceptibility testing.      Narrative:    "    CALL  [facility]  [facility] tel. [phone],  Per Hospital Policy: Only change Specimen Src: to \"Line\" if  specified by physician order.  Left AC    BLOOD CULTURE [[id number]]  (Abnormal) Collected:  10/15/19 2235    Order Status:  Completed Specimen:  Blood from Peripheral Updated:  10/17/19 0426     Significant Indicator POS     Source BLD     Site PERIPHERAL     Culture Result Growth detected by Bactec instrument. 10/17/2019  04:23  Gram Stain: Gram positive cocci: Possible Streptococcus sp.  Blood culture testing and Gram stain, if indicated, are  performed at [facility], [address] [place] [address].  Positive blood cultures are  sent to [facility], [address] [place] [address], for organism identification and  susceptibility testing.      Narrative:       CALL  Raman  ED[facility] tel. [phone],  Per Hospital Policy: Only change Specimen Src: to \"Line\" if  specified by physician order.  Left           Plan:   Pt was transferred to [facility] from [facility] via [facility]. Pt is still admitted. I called and spoke to his nurse, Fran. I informed her of the results and faxed them to [phone] at her request. She reported that attending was aware of positive culture results and had ordered TTE.       Diaz Becker, PharmD      "

## 2019-10-18 NOTE — ED NOTES
Received call from micro lab informing me that the results for the blood cultures from on 10/15 were updated from possible strep species to gram positive rods. Per micro lab, it is also possible that blood cultures from 9/3 reported as viridans strep were also gram positive rods.     I called and informed the pt's nurse (Kurt) at [facility], and faxed the updated results to [phone] per his request.

## 2019-10-24 LAB — TEST NAME 95000: NORMAL

## 2021-01-14 DIAGNOSIS — Z23 NEED FOR VACCINATION: ICD-10-CM

## 2021-08-17 ENCOUNTER — APPOINTMENT (OUTPATIENT)
Dept: RADIOLOGY | Facility: MEDICAL CENTER | Age: 76
DRG: 684 | End: 2021-08-17
Attending: EMERGENCY MEDICINE
Payer: MEDICARE

## 2021-08-17 ENCOUNTER — HOSPITAL ENCOUNTER (INPATIENT)
Facility: MEDICAL CENTER | Age: 76
LOS: 1 days | DRG: 684 | End: 2021-08-18
Attending: EMERGENCY MEDICINE | Admitting: INTERNAL MEDICINE
Payer: MEDICARE

## 2021-08-17 DIAGNOSIS — R07.9 CHEST PAIN, UNSPECIFIED TYPE: ICD-10-CM

## 2021-08-17 PROBLEM — C61 PROSTATE CA (HCC): Status: ACTIVE | Noted: 2021-08-17

## 2021-08-17 PROBLEM — M10.9 GOUT: Status: ACTIVE | Noted: 2021-08-17

## 2021-08-17 PROBLEM — E78.5 HYPERLIPIDEMIA: Status: ACTIVE | Noted: 2021-08-17

## 2021-08-17 PROBLEM — N17.9 ACUTE KIDNEY INJURY (HCC): Status: ACTIVE | Noted: 2021-08-17

## 2021-08-17 PROBLEM — I95.9 HYPOTENSION: Status: ACTIVE | Noted: 2021-08-17

## 2021-08-17 PROBLEM — I10 HYPERTENSION: Status: ACTIVE | Noted: 2021-08-17

## 2021-08-17 PROBLEM — D64.9 ANEMIA: Status: ACTIVE | Noted: 2021-08-17

## 2021-08-17 LAB
ALBUMIN SERPL BCP-MCNC: 3.3 G/DL (ref 3.2–4.9)
ALBUMIN/GLOB SERPL: 1.1 G/DL
ALP SERPL-CCNC: 63 U/L (ref 30–99)
ALT SERPL-CCNC: 34 U/L (ref 2–50)
ANION GAP SERPL CALC-SCNC: 16 MMOL/L (ref 7–16)
APTT PPP: 35.7 SEC (ref 24.7–36)
AST SERPL-CCNC: 18 U/L (ref 12–45)
BASOPHILS # BLD AUTO: 0.7 % (ref 0–1.8)
BASOPHILS # BLD: 0.05 K/UL (ref 0–0.12)
BILIRUB SERPL-MCNC: 0.5 MG/DL (ref 0.1–1.5)
BUN SERPL-MCNC: 43 MG/DL (ref 8–22)
CALCIUM SERPL-MCNC: 8.6 MG/DL (ref 8.5–10.5)
CHLORIDE SERPL-SCNC: 102 MMOL/L (ref 96–112)
CO2 SERPL-SCNC: 19 MMOL/L (ref 20–33)
CREAT SERPL-MCNC: 1.99 MG/DL (ref 0.5–1.4)
CRP SERPL HS-MCNC: 0.76 MG/DL (ref 0–0.75)
D DIMER PPP IA.FEU-MCNC: 1.52 UG/ML (FEU) (ref 0–0.5)
EKG IMPRESSION: NORMAL
EOSINOPHIL # BLD AUTO: 0.28 K/UL (ref 0–0.51)
EOSINOPHIL NFR BLD: 3.7 % (ref 0–6.9)
ERYTHROCYTE [DISTWIDTH] IN BLOOD BY AUTOMATED COUNT: 45.1 FL (ref 35.9–50)
FIBRINOGEN PPP-MCNC: 447 MG/DL (ref 215–460)
FLUAV RNA SPEC QL NAA+PROBE: NEGATIVE
FLUBV RNA SPEC QL NAA+PROBE: NEGATIVE
GLOBULIN SER CALC-MCNC: 3 G/DL (ref 1.9–3.5)
GLUCOSE SERPL-MCNC: 131 MG/DL (ref 65–99)
HCT VFR BLD AUTO: 27.5 % (ref 42–52)
HGB BLD-MCNC: 9.3 G/DL (ref 14–18)
IMM GRANULOCYTES # BLD AUTO: 0.05 K/UL (ref 0–0.11)
IMM GRANULOCYTES NFR BLD AUTO: 0.7 % (ref 0–0.9)
INR PPP: 1.29 (ref 0.87–1.13)
LDH SERPL L TO P-CCNC: 230 U/L (ref 107–266)
LIPASE SERPL-CCNC: 40 U/L (ref 11–82)
LYMPHOCYTES # BLD AUTO: 1.23 K/UL (ref 1–4.8)
LYMPHOCYTES NFR BLD: 16.2 % (ref 22–41)
MAGNESIUM SERPL-MCNC: 1.5 MG/DL (ref 1.5–2.5)
MCH RBC QN AUTO: 32.1 PG (ref 27–33)
MCHC RBC AUTO-ENTMCNC: 33.8 G/DL (ref 33.7–35.3)
MCV RBC AUTO: 94.8 FL (ref 81.4–97.8)
MONOCYTES # BLD AUTO: 0.73 K/UL (ref 0–0.85)
MONOCYTES NFR BLD AUTO: 9.6 % (ref 0–13.4)
NEUTROPHILS # BLD AUTO: 5.23 K/UL (ref 1.82–7.42)
NEUTROPHILS NFR BLD: 69.1 % (ref 44–72)
NRBC # BLD AUTO: 0 K/UL
NRBC BLD-RTO: 0 /100 WBC
PLATELET # BLD AUTO: 122 K/UL (ref 164–446)
PMV BLD AUTO: 10.2 FL (ref 9–12.9)
POTASSIUM SERPL-SCNC: 3.7 MMOL/L (ref 3.6–5.5)
PROT SERPL-MCNC: 6.3 G/DL (ref 6–8.2)
PROTHROMBIN TIME: 15.8 SEC (ref 12–14.6)
RBC # BLD AUTO: 2.9 M/UL (ref 4.7–6.1)
RSV RNA SPEC QL NAA+PROBE: NEGATIVE
SARS-COV-2 RNA RESP QL NAA+PROBE: NOTDETECTED
SODIUM SERPL-SCNC: 137 MMOL/L (ref 135–145)
SPECIMEN SOURCE: NORMAL
TROPONIN T SERPL-MCNC: 70 NG/L (ref 6–19)
TSH SERPL DL<=0.005 MIU/L-ACNC: 2.6 UIU/ML (ref 0.38–5.33)
WBC # BLD AUTO: 7.6 K/UL (ref 4.8–10.8)

## 2021-08-17 PROCEDURE — 71275 CT ANGIOGRAPHY CHEST: CPT | Mod: ME

## 2021-08-17 PROCEDURE — 0241U HCHG SARS-COV-2 COVID-19 NFCT DS RESP RNA 4 TRGT MIC: CPT

## 2021-08-17 PROCEDURE — 93005 ELECTROCARDIOGRAM TRACING: CPT

## 2021-08-17 PROCEDURE — 83690 ASSAY OF LIPASE: CPT

## 2021-08-17 PROCEDURE — 84484 ASSAY OF TROPONIN QUANT: CPT

## 2021-08-17 PROCEDURE — 99285 EMERGENCY DEPT VISIT HI MDM: CPT

## 2021-08-17 PROCEDURE — C9803 HOPD COVID-19 SPEC COLLECT: HCPCS | Performed by: STUDENT IN AN ORGANIZED HEALTH CARE EDUCATION/TRAINING PROGRAM

## 2021-08-17 PROCEDURE — 85379 FIBRIN DEGRADATION QUANT: CPT

## 2021-08-17 PROCEDURE — 83615 LACTATE (LD) (LDH) ENZYME: CPT

## 2021-08-17 PROCEDURE — 700111 HCHG RX REV CODE 636 W/ 250 OVERRIDE (IP): Performed by: EMERGENCY MEDICINE

## 2021-08-17 PROCEDURE — 71045 X-RAY EXAM CHEST 1 VIEW: CPT

## 2021-08-17 PROCEDURE — 96374 THER/PROPH/DIAG INJ IV PUSH: CPT

## 2021-08-17 PROCEDURE — 700105 HCHG RX REV CODE 258: Performed by: EMERGENCY MEDICINE

## 2021-08-17 PROCEDURE — A9270 NON-COVERED ITEM OR SERVICE: HCPCS | Performed by: INTERNAL MEDICINE

## 2021-08-17 PROCEDURE — 96372 THER/PROPH/DIAG INJ SC/IM: CPT

## 2021-08-17 PROCEDURE — 96375 TX/PRO/DX INJ NEW DRUG ADDON: CPT

## 2021-08-17 PROCEDURE — 80053 COMPREHEN METABOLIC PANEL: CPT

## 2021-08-17 PROCEDURE — 86140 C-REACTIVE PROTEIN: CPT

## 2021-08-17 PROCEDURE — 770020 HCHG ROOM/CARE - TELE (206)

## 2021-08-17 PROCEDURE — 700111 HCHG RX REV CODE 636 W/ 250 OVERRIDE (IP): Performed by: STUDENT IN AN ORGANIZED HEALTH CARE EDUCATION/TRAINING PROGRAM

## 2021-08-17 PROCEDURE — 85025 COMPLETE CBC W/AUTO DIFF WBC: CPT

## 2021-08-17 PROCEDURE — 85610 PROTHROMBIN TIME: CPT

## 2021-08-17 PROCEDURE — 83735 ASSAY OF MAGNESIUM: CPT

## 2021-08-17 PROCEDURE — 93005 ELECTROCARDIOGRAM TRACING: CPT | Performed by: EMERGENCY MEDICINE

## 2021-08-17 PROCEDURE — 700102 HCHG RX REV CODE 250 W/ 637 OVERRIDE(OP): Performed by: STUDENT IN AN ORGANIZED HEALTH CARE EDUCATION/TRAINING PROGRAM

## 2021-08-17 PROCEDURE — 700111 HCHG RX REV CODE 636 W/ 250 OVERRIDE (IP): Performed by: INTERNAL MEDICINE

## 2021-08-17 PROCEDURE — 36415 COLL VENOUS BLD VENIPUNCTURE: CPT

## 2021-08-17 PROCEDURE — 85730 THROMBOPLASTIN TIME PARTIAL: CPT

## 2021-08-17 PROCEDURE — 99223 1ST HOSP IP/OBS HIGH 75: CPT | Mod: AI,GC | Performed by: INTERNAL MEDICINE

## 2021-08-17 PROCEDURE — 85384 FIBRINOGEN ACTIVITY: CPT

## 2021-08-17 PROCEDURE — 700102 HCHG RX REV CODE 250 W/ 637 OVERRIDE(OP): Performed by: INTERNAL MEDICINE

## 2021-08-17 PROCEDURE — 700117 HCHG RX CONTRAST REV CODE 255: Performed by: EMERGENCY MEDICINE

## 2021-08-17 PROCEDURE — A9270 NON-COVERED ITEM OR SERVICE: HCPCS | Performed by: STUDENT IN AN ORGANIZED HEALTH CARE EDUCATION/TRAINING PROGRAM

## 2021-08-17 PROCEDURE — 84443 ASSAY THYROID STIM HORMONE: CPT

## 2021-08-17 RX ORDER — ATORVASTATIN CALCIUM 20 MG/1
20 TABLET, FILM COATED ORAL
Status: DISCONTINUED | OUTPATIENT
Start: 2021-08-17 | End: 2021-08-18 | Stop reason: HOSPADM

## 2021-08-17 RX ORDER — PREDNISONE 20 MG/1
40 TABLET ORAL DAILY
Status: DISCONTINUED | OUTPATIENT
Start: 2021-08-17 | End: 2021-08-18 | Stop reason: HOSPADM

## 2021-08-17 RX ORDER — VITAMIN E 268 MG
180 CAPSULE ORAL DAILY
COMMUNITY

## 2021-08-17 RX ORDER — CEFDINIR 300 MG/1
300 CAPSULE ORAL EVERY 12 HOURS
Status: DISCONTINUED | OUTPATIENT
Start: 2021-08-17 | End: 2021-08-18 | Stop reason: HOSPADM

## 2021-08-17 RX ORDER — POLYETHYLENE GLYCOL 3350 17 G/17G
1 POWDER, FOR SOLUTION ORAL
Status: DISCONTINUED | OUTPATIENT
Start: 2021-08-17 | End: 2021-08-18 | Stop reason: HOSPADM

## 2021-08-17 RX ORDER — IBUPROFEN 200 MG
600 CAPSULE ORAL DAILY
COMMUNITY

## 2021-08-17 RX ORDER — HYDROCHLOROTHIAZIDE 12.5 MG/1
12.5 CAPSULE, GELATIN COATED ORAL EVERY MORNING
Status: ON HOLD | COMMUNITY
End: 2021-08-18

## 2021-08-17 RX ORDER — METHYLPREDNISOLONE SODIUM SUCCINATE 125 MG/2ML
125 INJECTION, POWDER, LYOPHILIZED, FOR SOLUTION INTRAMUSCULAR; INTRAVENOUS ONCE
Status: COMPLETED | OUTPATIENT
Start: 2021-08-17 | End: 2021-08-17

## 2021-08-17 RX ORDER — AMOXICILLIN 250 MG
2 CAPSULE ORAL 2 TIMES DAILY
Status: DISCONTINUED | OUTPATIENT
Start: 2021-08-17 | End: 2021-08-18 | Stop reason: HOSPADM

## 2021-08-17 RX ORDER — TAMSULOSIN HYDROCHLORIDE 0.4 MG/1
0.4 CAPSULE ORAL EVERY MORNING
COMMUNITY

## 2021-08-17 RX ORDER — TAMSULOSIN HYDROCHLORIDE 0.4 MG/1
0.4 CAPSULE ORAL EVERY MORNING
Status: DISCONTINUED | OUTPATIENT
Start: 2021-08-18 | End: 2021-08-18 | Stop reason: HOSPADM

## 2021-08-17 RX ORDER — SODIUM CHLORIDE 9 MG/ML
1000 INJECTION, SOLUTION INTRAVENOUS ONCE
Status: COMPLETED | OUTPATIENT
Start: 2021-08-17 | End: 2021-08-17

## 2021-08-17 RX ORDER — GINSENG 100 MG
50 CAPSULE ORAL DAILY
COMMUNITY

## 2021-08-17 RX ORDER — DIPHENHYDRAMINE HYDROCHLORIDE 50 MG/ML
50 INJECTION INTRAMUSCULAR; INTRAVENOUS ONCE
Status: COMPLETED | OUTPATIENT
Start: 2021-08-17 | End: 2021-08-17

## 2021-08-17 RX ORDER — ASPIRIN 81 MG/1
324 TABLET, CHEWABLE ORAL ONCE
Status: DISPENSED | OUTPATIENT
Start: 2021-08-17 | End: 2021-08-18

## 2021-08-17 RX ORDER — GABAPENTIN 300 MG/1
300 CAPSULE ORAL 2 TIMES DAILY
Status: DISCONTINUED | OUTPATIENT
Start: 2021-08-17 | End: 2021-08-18 | Stop reason: HOSPADM

## 2021-08-17 RX ORDER — ACETAMINOPHEN 325 MG/1
650 TABLET ORAL EVERY 6 HOURS PRN
Status: DISCONTINUED | OUTPATIENT
Start: 2021-08-17 | End: 2021-08-18 | Stop reason: HOSPADM

## 2021-08-17 RX ORDER — LABETALOL HYDROCHLORIDE 5 MG/ML
10 INJECTION, SOLUTION INTRAVENOUS EVERY 4 HOURS PRN
Status: DISCONTINUED | OUTPATIENT
Start: 2021-08-17 | End: 2021-08-18 | Stop reason: HOSPADM

## 2021-08-17 RX ORDER — ASPIRIN 300 MG/1
300 SUPPOSITORY RECTAL ONCE
Status: ACTIVE | OUTPATIENT
Start: 2021-08-17 | End: 2021-08-18

## 2021-08-17 RX ORDER — MULTIVIT WITH MINERALS/LUTEIN
1000 TABLET ORAL DAILY
COMMUNITY

## 2021-08-17 RX ORDER — BISACODYL 10 MG
10 SUPPOSITORY, RECTAL RECTAL
Status: DISCONTINUED | OUTPATIENT
Start: 2021-08-17 | End: 2021-08-18 | Stop reason: HOSPADM

## 2021-08-17 RX ORDER — DOXYCYCLINE 100 MG/1
100 TABLET ORAL EVERY 12 HOURS
Status: DISCONTINUED | OUTPATIENT
Start: 2021-08-17 | End: 2021-08-18 | Stop reason: HOSPADM

## 2021-08-17 RX ORDER — BOSWELLIA SERRATA EXTRACT 70 %
1 POWDER (GRAM) MISCELLANEOUS DAILY
COMMUNITY

## 2021-08-17 RX ORDER — MAG HYDROX/ALUMINUM HYD/SIMETH 400-400-40
1400 SUSPENSION, ORAL (FINAL DOSE FORM) ORAL DAILY
COMMUNITY

## 2021-08-17 RX ORDER — NIACINAMIDE, ADENOSINE 5; .1 G/250ML; G/250ML
300 LIQUID TOPICAL DAILY
COMMUNITY

## 2021-08-17 RX ORDER — ALLOPURINOL 100 MG/1
100 TABLET ORAL 2 TIMES DAILY
Status: DISCONTINUED | OUTPATIENT
Start: 2021-08-17 | End: 2021-08-18 | Stop reason: HOSPADM

## 2021-08-17 RX ORDER — HEPARIN SODIUM 5000 [USP'U]/ML
5000 INJECTION, SOLUTION INTRAVENOUS; SUBCUTANEOUS EVERY 8 HOURS
Status: DISCONTINUED | OUTPATIENT
Start: 2021-08-17 | End: 2021-08-18 | Stop reason: HOSPADM

## 2021-08-17 RX ORDER — ENALAPRILAT 1.25 MG/ML
1.25 INJECTION INTRAVENOUS EVERY 6 HOURS PRN
Status: DISCONTINUED | OUTPATIENT
Start: 2021-08-17 | End: 2021-08-18 | Stop reason: HOSPADM

## 2021-08-17 RX ORDER — LOSARTAN POTASSIUM 100 MG/1
100 TABLET ORAL EVERY MORNING
Status: ON HOLD | COMMUNITY
End: 2021-08-18 | Stop reason: SDUPTHER

## 2021-08-17 RX ORDER — GUAIFENESIN 600 MG/1
600 TABLET, EXTENDED RELEASE ORAL EVERY 12 HOURS
Status: DISCONTINUED | OUTPATIENT
Start: 2021-08-17 | End: 2021-08-18 | Stop reason: HOSPADM

## 2021-08-17 RX ADMIN — METHYLPREDNISOLONE SODIUM SUCCINATE 125 MG: 125 INJECTION, POWDER, FOR SOLUTION INTRAMUSCULAR; INTRAVENOUS at 14:28

## 2021-08-17 RX ADMIN — DIPHENHYDRAMINE HYDROCHLORIDE 50 MG: 50 INJECTION INTRAMUSCULAR; INTRAVENOUS at 14:28

## 2021-08-17 RX ADMIN — PREDNISONE 40 MG: 20 TABLET ORAL at 17:58

## 2021-08-17 RX ADMIN — IOHEXOL 100 ML: 350 INJECTION, SOLUTION INTRAVENOUS at 15:18

## 2021-08-17 RX ADMIN — ALLOPURINOL 100 MG: 100 TABLET ORAL at 17:58

## 2021-08-17 RX ADMIN — HEPARIN SODIUM 5000 UNITS: 5000 INJECTION, SOLUTION INTRAVENOUS; SUBCUTANEOUS at 22:23

## 2021-08-17 RX ADMIN — CEFDINIR 300 MG: 300 CAPSULE ORAL at 17:58

## 2021-08-17 RX ADMIN — SODIUM CHLORIDE 1000 ML: 9 INJECTION, SOLUTION INTRAVENOUS at 13:09

## 2021-08-17 RX ADMIN — GABAPENTIN 300 MG: 300 CAPSULE ORAL at 17:58

## 2021-08-17 RX ADMIN — HEPARIN SODIUM 5000 UNITS: 5000 INJECTION, SOLUTION INTRAVENOUS; SUBCUTANEOUS at 16:56

## 2021-08-17 RX ADMIN — GUAIFENESIN 600 MG: 600 TABLET, EXTENDED RELEASE ORAL at 17:58

## 2021-08-17 RX ADMIN — DOXYCYCLINE 100 MG: 100 TABLET, FILM COATED ORAL at 17:58

## 2021-08-17 RX ADMIN — ATORVASTATIN CALCIUM 20 MG: 20 TABLET, FILM COATED ORAL at 22:23

## 2021-08-17 ASSESSMENT — ENCOUNTER SYMPTOMS
EYE PAIN: 0
CHILLS: 0
FEVER: 0
WHEEZING: 0
MYALGIAS: 0
ABDOMINAL PAIN: 0
ORTHOPNEA: 0
PALPITATIONS: 1
HEADACHES: 0
DIAPHORESIS: 1
PND: 0
COUGH: 0

## 2021-08-17 ASSESSMENT — LIFESTYLE VARIABLES: DO YOU DRINK ALCOHOL: NO

## 2021-08-17 ASSESSMENT — FIBROSIS 4 INDEX: FIB4 SCORE: 4.32

## 2021-08-17 ASSESSMENT — PAIN DESCRIPTION - PAIN TYPE: TYPE: ACUTE PAIN

## 2021-08-17 NOTE — ED PROVIDER NOTES
ED Provider Note    CHIEF COMPLAINT  Chief Complaint   Patient presents with   • Chest Pain     Sweaty, shaky, fatigued, weakness, pain 10/10 at time of onset; now CP only when taking deep breath;  for EMS, 111 on arrival       HPI  Khadar Us is a 75 y.o. male who presents per EMS after the patient developed the sudden onset of difficulty with breathing, chest pain, fatigue, weakness, and diaphoresis.  Upon arrival the patient had a heart rate of 158 and his ox saturations in the high 80s.  He was discharged from [facility] from Covid pneumonia about a month ago and he states he has not felt well since then.  He states he has had some improvement.  He states he does feel better now.  He does not take any anticoagulation he has no known history of heart disease does not have any cardiac arrhythmia that he is aware of.  He states he is supposed to be on oxygen when he was discharged but he has failed to follow-up and get an oxygen canister.  The patient does not have any pain or swelling to his lower extremities.  His chest pain is pleuritic.    REVIEW OF SYSTEMS  See HPI for further details. All other systems are negative.     PAST MEDICAL HISTORY  Past Medical History:   Diagnosis Date   • Gout    • Heart murmur    • Skull fracture (HCC)        FAMILY HISTORY  [unfilled]    SOCIAL HISTORY  Social History     Socioeconomic History   • Marital status:      Spouse name: Not on file   • Number of children: Not on file   • Years of education: Not on file   • Highest education level: Not on file   Occupational History   • Not on file   Tobacco Use   • Smoking status: Former Smoker   • Smokeless tobacco: Never Used   Vaping Use   • Vaping Use: Never used   Substance and Sexual Activity   • Alcohol use: Yes     Comment: Occasionally   • Drug use: No   • Sexual activity: Not on file   Other Topics Concern   • Not on file   Social History Narrative   • Not on file     Social Determinants of  "Health     Financial Resource Strain:    • Difficulty of Paying Living Expenses:    Food Insecurity:    • Worried About Running Out of Food in the Last Year:    • Ran Out of Food in the Last Year:    Transportation Needs:    • Lack of Transportation (Medical):    • Lack of Transportation (Non-Medical):    Physical Activity:    • Days of Exercise per Week:    • Minutes of Exercise per Session:    Stress:    • Feeling of Stress :    Social Connections:    • Frequency of Communication with Friends and Family:    • Frequency of Social Gatherings with Friends and Family:    • Attends Jewish Services:    • Active Member of Clubs or Organizations:    • Attends Club or Organization Meetings:    • Marital Status:    Intimate Partner Violence:    • Fear of Current or Ex-Partner:    • Emotionally Abused:    • Physically Abused:    • Sexually Abused:        SURGICAL HISTORY  Past Surgical History:   Procedure Laterality Date   • HEMORRHOIDECTOMY     • OTHER      hemorhoidectony x 2   • OTHER ORTHOPEDIC SURGERY      [facility]       CURRENT MEDICATIONS  Home Medications     Reviewed by Meaghan Diallo R.N. (Registered Nurse) on 08/17/21 at 1140  Med List Status: Not Addressed   Medication Last Dose Status   allopurinol (ZYLOPRIM) 100 MG Tab  Active   APPLE CIDER VINEGAR PO  Active   atorvastatin (LIPITOR) 20 MG Tab  Active   gabapentin (NEURONTIN) 300 MG Cap  Active   irbesartan (AVAPRO) 300 MG Tab  Active   MAGNESIUM PO  Active   naproxen (ALEVE) 220 MG tablet  Active   Omega-3 Fatty Acids (FISH OIL) 1000 MG Cap capsule  Active   therapeutic multivitamin-minerals (THERAGRAN-M) Tab  Active   TURMERIC PO  Active   vitamin D (CHOLECALCIFEROL) 1000 UNIT Tab  Active                ALLERGIES  Allergies   Allergen Reactions   • Contrast Media With Iodine [Iodine] Anaphylaxis   • Sulfa Drugs Unspecified     \"Does not work, make the infection worst\".         PHYSICAL EXAM  VITAL SIGNS: BP (!) 97/53   Pulse (!) 110   " "Temp 36.9 °C (98.4 °F) (Temporal)   Resp 18   Ht 1.93 m (6' 4\")   Wt 90.7 kg (200 lb)   SpO2 97%   BMI 24.34 kg/m²       Constitutional: In acute distress, Non-toxic appearance.   HENT: Normocephalic, Atraumatic, Bilateral external ears normal, Oropharynx moist, No oral exudates, Nose normal.   Eyes: PERRLA, EOMI, Conjunctiva normal, No discharge.   Neck: Normal range of motion, No tenderness, Supple, No stridor.   Lymphatic: No lymphadenopathy noted.   Cardiovascular: Tachycardic heart rate, Normal rhythm, No murmurs, No rubs, No gallops.   Thorax & Lungs: Symmetrically diminished throughout, No respiratory distress, No wheezing, No chest tenderness.   Abdomen: Bowel sounds normal, Soft, No tenderness, No masses, No pulsatile masses.   Skin: Warm, Dry, No erythema, No rash.   Back: No tenderness, No CVA tenderness.   Extremities: Intact distal pulses, No edema, No tenderness, No cyanosis, No clubbing.    Neurologic: Alert & oriented x 3, Normal motor function, Normal sensory function, No focal deficits noted.   Psychiatric: Affect normal, Judgment normal, Mood normal.     Results for orders placed or performed during the hospital encounter of 08/17/21   CBC with Differential   Result Value Ref Range    WBC 7.6 4.8 - 10.8 K/uL    RBC 2.90 (L) 4.70 - 6.10 M/uL    Hemoglobin 9.3 (L) 14.0 - 18.0 g/dL    Hematocrit 27.5 (L) 42.0 - 52.0 %    MCV 94.8 81.4 - 97.8 fL    MCH 32.1 27.0 - 33.0 pg    MCHC 33.8 33.7 - 35.3 g/dL    RDW 45.1 35.9 - 50.0 fL    Platelet Count 122 (L) 164 - 446 K/uL    MPV 10.2 9.0 - 12.9 fL    Neutrophils-Polys 69.10 44.00 - 72.00 %    Lymphocytes 16.20 (L) 22.00 - 41.00 %    Monocytes 9.60 0.00 - 13.40 %    Eosinophils 3.70 0.00 - 6.90 %    Basophils 0.70 0.00 - 1.80 %    Immature Granulocytes 0.70 0.00 - 0.90 %    Nucleated RBC 0.00 /100 WBC    Neutrophils (Absolute) 5.23 1.82 - 7.42 K/uL    Lymphs (Absolute) 1.23 1.00 - 4.80 K/uL    Monos (Absolute) 0.73 0.00 - 0.85 K/uL    Eos (Absolute) " 0.28 0.00 - 0.51 K/uL    Baso (Absolute) 0.05 0.00 - 0.12 K/uL    Immature Granulocytes (abs) 0.05 0.00 - 0.11 K/uL    NRBC (Absolute) 0.00 K/uL   Complete Metabolic Panel (CMP)   Result Value Ref Range    Sodium 137 135 - 145 mmol/L    Potassium 3.7 3.6 - 5.5 mmol/L    Chloride 102 96 - 112 mmol/L    Co2 19 (L) 20 - 33 mmol/L    Anion Gap 16.0 7.0 - 16.0    Glucose 131 (H) 65 - 99 mg/dL    Bun 43 (H) 8 - 22 mg/dL    Creatinine 1.99 (H) 0.50 - 1.40 mg/dL    Calcium 8.6 8.5 - 10.5 mg/dL    AST(SGOT) 18 12 - 45 U/L    ALT(SGPT) 34 2 - 50 U/L    Alkaline Phosphatase 63 30 - 99 U/L    Total Bilirubin 0.5 0.1 - 1.5 mg/dL    Albumin 3.3 3.2 - 4.9 g/dL    Total Protein 6.3 6.0 - 8.2 g/dL    Globulin 3.0 1.9 - 3.5 g/dL    A-G Ratio 1.1 g/dL   Troponin   Result Value Ref Range    Troponin T 70 (H) 6 - 19 ng/L   Lipase   Result Value Ref Range    Lipase 40 11 - 82 U/L   PT/INR   Result Value Ref Range    PT 15.8 (H) 12.0 - 14.6 sec    INR 1.29 (H) 0.87 - 1.13   APTT   Result Value Ref Range    APTT 35.7 24.7 - 36.0 sec   TSH (for screening thyroid dysfunction)   Result Value Ref Range    TSH 2.600 0.380 - 5.330 uIU/mL   ESTIMATED GFR   Result Value Ref Range    GFR If  40 (A) >60 mL/min/1.73 m 2    GFR If Non  33 (A) >60 mL/min/1.73 m 2   D-DIMER   Result Value Ref Range    D-Dimer Screen 1.52 (H) 0.00 - 0.50 ug/mL (FEU)   EKG   Result Value Ref Range    Report       [facility] Emergency Dept.    Test Date:  2021  Pt Name:    LUIS LEWIS             Department: ER  MRN:        [id number]                      Room:       BL 21  Gender:     Male                         Technician: [id number]  :        [id number]                   Requested By:ER TRIAGE PROTOCOL  Order #:    [id number]                    Reading MD: SUNI MASTERS MD    Measurements  Intervals                                Axis  Rate:       108                          P:          42  MD:         208                           QRS:        22  QRSD:       82                           T:          -1  QT:         340  QTc:        456    Interpretive Statements  Twelve-lead EKG shows a sinus tachycardia with a ventricular rate of 108, T  wave  inversion in lead III and flattened aVF, no ST segment elevation or  depression,  occasional premature atrial contractions.  Electronically Signed On 8- 13:35:46 PDT by SUNI MASTERS MD         RADIOLOGY/PROCEDURES  DX-CHEST-PORTABLE (1 VIEW)   Final Result      1.  Bilateral patchy alveolar opacities are present which represent alveolar edema or pneumonia. Differential diagnosis would include Covid 19 pneumonia.      2.  No lobar consolidation.      CT-CTA CHEST PULMONARY ARTERY W/ RECONS    (Results Pending)         COURSE & MEDICAL DECISION MAKING  Pertinent Labs & Imaging studies reviewed. (See chart for details)  This a 75-year-old gentleman who presents the emergency department acutely ill with chest pain followed by diaphoresis, generalized weakness, and some difficulty with breathing.  EMS does present with a rhythm strip that does show an SVT with a ventricular rate in the 150s.  This was narrow.  On my exam he was down into the 110 region.  He was recently discharged from [facility] for Covid pneumonia.  Chest x-ray is consistent with pneumonia.  Secondary to his presenting symptoms as well as the tachycardia was concern for pulmonary embolus and ordered a CT angiogram of the chest.  However the patient states that he has had a severe reaction to contrast and therefore the study was canceled.  A D-dimer was ordered and is elevated at 1.5 but not greater than 2.5.  Therefore his presenting symptoms still could be from Covid pneumonia.  His labs are concerning as he does now have an acute anemia as his hemoglobin at discharge was 13.8 and hematocrit at 40.9.  Currently his hemoglobin and hematocrit is 9.3 and 27.5.  The patient also has acute renal  insufficiency as his BUN and creatinine was normal at [facility] and now is elevated at 43 and 1.99.  The patient will receive a liter of fluid as I suspect this is prerenal.  Hesitant to provide any further anticoagulation besides aspirin as the patient is currently anemic.  Therefore admit the patient to the hospital for pretreatment for contrast allergy for a CT scan to see if the truly is a pulmonary embolus.  The patient also may require continued IV hydration for the renal insufficiency.  At this time it will have a clear source for the anemia and this could be from the current illness.  There is no active bleeding at this time that I can appreciate.  The patient's troponin is slightly elevated but this is probably from his renal sufficiency as his EKG does not support ischemia.  This cannot be fully excluded.  Of note the patient did receive aspirin prior to arrival per EMS and the patient is hypoxic with an ox saturation 88% on room air.    FINAL IMPRESSION  1.  Pleuritic chest pain  2. Covid Pneumonia with hypoxemia  3.  Anemia  4.  Acute renal insufficiency  5.  Elevated troponin rule out acute coronary syndrome  6.  Critical care time 30 minutes         Electronically signed by: Eliot Godinez M.D., 8/17/2021 11:49 AM

## 2021-08-17 NOTE — ED NOTES
Covid swab collect, pt aware of need for urine sample. Pre medicated for CT, see MAR. Updated on POC. All needs met at this time.

## 2021-08-17 NOTE — ED TRIAGE NOTES
"Khadar Us  75 y.o.  Chief Complaint   Patient presents with   • Chest Pain     Sweaty, shaky, fatigued, weakness, pain 10/10 at time of onset; now CP only when taking deep breath;  for EMS, 111 on arrival       BIBA for above. At 0900 this AM, pt started feeling above symptoms. HR for , received 850mL NS en route to hospital, HR now 111. Pt states he \"feels okay now.\" Blood drawn and sent, EKG complete. Pt A&Ox4, GCS 15. Chart up for ERP.  "

## 2021-08-17 NOTE — ED NOTES
Pt reports 20lb weight loss over past month, hospitalized for covid at [facility], discharged approximately 7/16

## 2021-08-17 NOTE — ED NOTES
Med rec complete per pt.     No current facility-administered medications on file prior to encounter.     Medication Sig   • losartan (COZAAR) 100 MG Tab Take 100 mg by mouth every morning.   • hydrochlorothiazide (MICROZIDE) 12.5 MG capsule Take 12.5 mg by mouth every morning.   • tamsulosin (FLOMAX) 0.4 MG capsule Take 0.4 mg by mouth every morning.   • Zinc 50 MG Tab Take 50 mg by mouth every day.   • Vitamin A 2400 MCG (8000 UT) Tab Take 2,400 mcg by mouth 2 times a day.   • vitamin E 180 MG (400 UNIT) Cap Take 180 mg by mouth every day.   • Omega-3 Fatty Acids (FISH OIL TRIPLE STRENGTH) 1400 MG Cap Take 1,400 mg by mouth every day.   • Ascorbic Acid (VITAMIN C) 1000 MG Tab Take 1,000 mg by mouth every day.   • [place] Extract Powder Take 1 Tablet by mouth every day.   • Calcium 600 MG Tab Take 600 mg by mouth every day.   • Probiotic Cap Take 1 Capsule by mouth 2 times a day.   • Coenzyme Q10 (COQ-10) 150 MG Cap Take 300 mg by mouth every day.   • Turmeric 500 MG Tab Take 2 Tablets by mouth every morning.   • vitamin D (CHOLECALCIFEROL) 1000 UNIT Tab Take 1,000 Units by mouth 2 times a day.   • MAGNESIUM PO Take 1 Tab by mouth every day.   • therapeutic multivitamin-minerals (THERAGRAN-M) Tab Take 1 Tablet by mouth every day.   • allopurinol (ZYLOPRIM) 100 MG Tab Take 100 mg by mouth 2 times a day.   • atorvastatin (LIPITOR) 20 MG Tab Take 20 mg by mouth at bedtime.   • gabapentin (NEURONTIN) 300 MG Cap Take 300 mg by mouth 2 times a day.

## 2021-08-18 ENCOUNTER — PATIENT OUTREACH (OUTPATIENT)
Dept: HEALTH INFORMATION MANAGEMENT | Facility: OTHER | Age: 76
End: 2021-08-18

## 2021-08-18 VITALS
HEIGHT: 76 IN | WEIGHT: 202.6 LBS | RESPIRATION RATE: 16 BRPM | HEART RATE: 91 BPM | BODY MASS INDEX: 24.67 KG/M2 | SYSTOLIC BLOOD PRESSURE: 108 MMHG | TEMPERATURE: 97.3 F | OXYGEN SATURATION: 95 % | DIASTOLIC BLOOD PRESSURE: 56 MMHG

## 2021-08-18 LAB
ALBUMIN SERPL BCP-MCNC: 3.4 G/DL (ref 3.2–4.9)
ALBUMIN/GLOB SERPL: 1.1 G/DL
ALP SERPL-CCNC: 66 U/L (ref 30–99)
ALT SERPL-CCNC: 31 U/L (ref 2–50)
ANION GAP SERPL CALC-SCNC: 10 MMOL/L (ref 7–16)
AST SERPL-CCNC: 16 U/L (ref 12–45)
BASOPHILS # BLD AUTO: 0.2 % (ref 0–1.8)
BASOPHILS # BLD: 0.01 K/UL (ref 0–0.12)
BILIRUB SERPL-MCNC: 0.3 MG/DL (ref 0.1–1.5)
BUN SERPL-MCNC: 38 MG/DL (ref 8–22)
CALCIUM SERPL-MCNC: 8.6 MG/DL (ref 8.5–10.5)
CHLORIDE SERPL-SCNC: 102 MMOL/L (ref 96–112)
CHOLEST SERPL-MCNC: 153 MG/DL (ref 100–199)
CO2 SERPL-SCNC: 22 MMOL/L (ref 20–33)
CREAT SERPL-MCNC: 1.38 MG/DL (ref 0.5–1.4)
EOSINOPHIL # BLD AUTO: 0 K/UL (ref 0–0.51)
EOSINOPHIL NFR BLD: 0 % (ref 0–6.9)
ERYTHROCYTE [DISTWIDTH] IN BLOOD BY AUTOMATED COUNT: 45 FL (ref 35.9–50)
EST. AVERAGE GLUCOSE BLD GHB EST-MCNC: 140 MG/DL
FERRITIN SERPL-MCNC: 457 NG/ML (ref 22–322)
GLOBULIN SER CALC-MCNC: 3 G/DL (ref 1.9–3.5)
GLUCOSE SERPL-MCNC: 196 MG/DL (ref 65–99)
HBA1C MFR BLD: 6.5 % (ref 4–5.6)
HCT VFR BLD AUTO: 28.7 % (ref 42–52)
HDLC SERPL-MCNC: 41 MG/DL
HGB BLD-MCNC: 9.5 G/DL (ref 14–18)
IMM GRANULOCYTES # BLD AUTO: 0.04 K/UL (ref 0–0.11)
IMM GRANULOCYTES NFR BLD AUTO: 0.8 % (ref 0–0.9)
IRON SATN MFR SERPL: 30 % (ref 15–55)
IRON SERPL-MCNC: 66 UG/DL (ref 50–180)
LDLC SERPL CALC-MCNC: 89 MG/DL
LYMPHOCYTES # BLD AUTO: 0.62 K/UL (ref 1–4.8)
LYMPHOCYTES NFR BLD: 12 % (ref 22–41)
MCH RBC QN AUTO: 31.6 PG (ref 27–33)
MCHC RBC AUTO-ENTMCNC: 33.1 G/DL (ref 33.7–35.3)
MCV RBC AUTO: 95.3 FL (ref 81.4–97.8)
MONOCYTES # BLD AUTO: 0.05 K/UL (ref 0–0.85)
MONOCYTES NFR BLD AUTO: 1 % (ref 0–13.4)
NEUTROPHILS # BLD AUTO: 4.46 K/UL (ref 1.82–7.42)
NEUTROPHILS NFR BLD: 86 % (ref 44–72)
NRBC # BLD AUTO: 0 K/UL
NRBC BLD-RTO: 0 /100 WBC
PLATELET # BLD AUTO: 110 K/UL (ref 164–446)
PMV BLD AUTO: 10.6 FL (ref 9–12.9)
POTASSIUM SERPL-SCNC: 3.9 MMOL/L (ref 3.6–5.5)
PROCALCITONIN SERPL-MCNC: 0.13 NG/ML
PROT SERPL-MCNC: 6.4 G/DL (ref 6–8.2)
RBC # BLD AUTO: 3.01 M/UL (ref 4.7–6.1)
SODIUM SERPL-SCNC: 134 MMOL/L (ref 135–145)
TIBC SERPL-MCNC: 218 UG/DL (ref 250–450)
TRIGL SERPL-MCNC: 117 MG/DL (ref 0–149)
TROPONIN T SERPL-MCNC: 29 NG/L (ref 6–19)
UIBC SERPL-MCNC: 152 UG/DL (ref 110–370)
VIT B12 SERPL-MCNC: 1197 PG/ML (ref 211–911)
WBC # BLD AUTO: 5.2 K/UL (ref 4.8–10.8)

## 2021-08-18 PROCEDURE — A9270 NON-COVERED ITEM OR SERVICE: HCPCS | Performed by: STUDENT IN AN ORGANIZED HEALTH CARE EDUCATION/TRAINING PROGRAM

## 2021-08-18 PROCEDURE — 83550 IRON BINDING TEST: CPT

## 2021-08-18 PROCEDURE — 85025 COMPLETE CBC W/AUTO DIFF WBC: CPT

## 2021-08-18 PROCEDURE — 700102 HCHG RX REV CODE 250 W/ 637 OVERRIDE(OP): Performed by: INTERNAL MEDICINE

## 2021-08-18 PROCEDURE — 36415 COLL VENOUS BLD VENIPUNCTURE: CPT

## 2021-08-18 PROCEDURE — 80061 LIPID PANEL: CPT

## 2021-08-18 PROCEDURE — 84484 ASSAY OF TROPONIN QUANT: CPT

## 2021-08-18 PROCEDURE — 84145 PROCALCITONIN (PCT): CPT

## 2021-08-18 PROCEDURE — 80053 COMPREHEN METABOLIC PANEL: CPT

## 2021-08-18 PROCEDURE — A9270 NON-COVERED ITEM OR SERVICE: HCPCS | Performed by: INTERNAL MEDICINE

## 2021-08-18 PROCEDURE — 82607 VITAMIN B-12: CPT

## 2021-08-18 PROCEDURE — 700111 HCHG RX REV CODE 636 W/ 250 OVERRIDE (IP): Performed by: STUDENT IN AN ORGANIZED HEALTH CARE EDUCATION/TRAINING PROGRAM

## 2021-08-18 PROCEDURE — 700111 HCHG RX REV CODE 636 W/ 250 OVERRIDE (IP): Performed by: INTERNAL MEDICINE

## 2021-08-18 PROCEDURE — 83540 ASSAY OF IRON: CPT

## 2021-08-18 PROCEDURE — 82728 ASSAY OF FERRITIN: CPT

## 2021-08-18 PROCEDURE — 700102 HCHG RX REV CODE 250 W/ 637 OVERRIDE(OP): Performed by: STUDENT IN AN ORGANIZED HEALTH CARE EDUCATION/TRAINING PROGRAM

## 2021-08-18 PROCEDURE — 83036 HEMOGLOBIN GLYCOSYLATED A1C: CPT

## 2021-08-18 RX ORDER — GUAIFENESIN 600 MG/1
600 TABLET, EXTENDED RELEASE ORAL EVERY 12 HOURS
Qty: 10 TABLET | Refills: 0 | OUTPATIENT
Start: 2021-08-18

## 2021-08-18 RX ORDER — GUAIFENESIN 600 MG/1
600 TABLET, EXTENDED RELEASE ORAL EVERY 12 HOURS
Qty: 10 TABLET | Refills: 0 | Status: SHIPPED | OUTPATIENT
Start: 2021-08-18 | End: 2021-08-23

## 2021-08-18 RX ORDER — CEFDINIR 300 MG/1
300 CAPSULE ORAL EVERY 12 HOURS
Qty: 8 CAPSULE | Refills: 0 | OUTPATIENT
Start: 2021-08-18 | End: 2021-08-22

## 2021-08-18 RX ORDER — LOSARTAN POTASSIUM 100 MG/1
50 TABLET ORAL EVERY MORNING
Qty: 15 EACH | Refills: 0 | Status: SHIPPED | OUTPATIENT
Start: 2021-08-18 | End: 2021-09-17

## 2021-08-18 RX ORDER — LOSARTAN POTASSIUM 100 MG/1
50 TABLET ORAL EVERY MORNING
Qty: 30 TABLET | Refills: 0 | Status: CANCELLED | OUTPATIENT
Start: 2021-08-18

## 2021-08-18 RX ORDER — PREDNISONE 20 MG/1
TABLET ORAL
Qty: 8 TABLET | Refills: 0 | OUTPATIENT
Start: 2021-08-19 | End: 2021-08-22

## 2021-08-18 RX ORDER — DOXYCYCLINE 100 MG/1
100 TABLET ORAL EVERY 12 HOURS
Qty: 8 TABLET | Refills: 0 | Status: SHIPPED | OUTPATIENT
Start: 2021-08-18 | End: 2021-08-22

## 2021-08-18 RX ORDER — OMEPRAZOLE 40 MG/1
40 CAPSULE, DELAYED RELEASE ORAL DAILY
Qty: 14 CAPSULE | Refills: 0 | OUTPATIENT
Start: 2021-08-18 | End: 2021-09-01

## 2021-08-18 RX ORDER — DOXYCYCLINE 100 MG/1
100 TABLET ORAL EVERY 12 HOURS
Qty: 8 TABLET | Refills: 0 | OUTPATIENT
Start: 2021-08-18 | End: 2021-08-22

## 2021-08-18 RX ORDER — PREDNISONE 20 MG/1
TABLET ORAL
Qty: 3 TABLET | Refills: 0 | Status: SHIPPED | OUTPATIENT
Start: 2021-08-19

## 2021-08-18 RX ORDER — OMEPRAZOLE 20 MG/1
20 CAPSULE, DELAYED RELEASE ORAL DAILY
Qty: 30 CAPSULE | Refills: 0 | Status: SHIPPED | OUTPATIENT
Start: 2021-08-18 | End: 2021-09-17

## 2021-08-18 RX ORDER — CEFDINIR 300 MG/1
300 CAPSULE ORAL EVERY 12 HOURS
Qty: 8 CAPSULE | Refills: 0 | Status: SHIPPED | OUTPATIENT
Start: 2021-08-18 | End: 2021-08-22

## 2021-08-18 RX ADMIN — GABAPENTIN 300 MG: 300 CAPSULE ORAL at 06:35

## 2021-08-18 RX ADMIN — PREDNISONE 40 MG: 20 TABLET ORAL at 06:35

## 2021-08-18 RX ADMIN — DOXYCYCLINE 100 MG: 100 TABLET, FILM COATED ORAL at 06:35

## 2021-08-18 RX ADMIN — GUAIFENESIN 600 MG: 600 TABLET, EXTENDED RELEASE ORAL at 06:35

## 2021-08-18 RX ADMIN — CEFDINIR 300 MG: 300 CAPSULE ORAL at 06:35

## 2021-08-18 RX ADMIN — ALLOPURINOL 100 MG: 100 TABLET ORAL at 06:35

## 2021-08-18 RX ADMIN — HEPARIN SODIUM 5000 UNITS: 5000 INJECTION, SOLUTION INTRAVENOUS; SUBCUTANEOUS at 06:35

## 2021-08-18 RX ADMIN — TAMSULOSIN HYDROCHLORIDE 0.4 MG: 0.4 CAPSULE ORAL at 06:35

## 2021-08-18 ASSESSMENT — FIBROSIS 4 INDEX: FIB4 SCORE: 2.01

## 2021-08-18 NOTE — ASSESSMENT & PLAN NOTE
His hb is decreased  MCV 94.8  Could be chronic anemia  Vit b12, iron, ferritin- pending results  ctm  F/u outpatient

## 2021-08-18 NOTE — CARE PLAN
The patient is Stable - Low risk of patient condition declining or worsening      Clinical Goals: continue to observe   Patient Goals: go home  Family Goals: na    Patient is not progressing towards the following goals: N/A.

## 2021-08-18 NOTE — ASSESSMENT & PLAN NOTE
Patient has a history of HTN.  His B.P was low on presentation,  Holding his home meds for hypertension till his bp stabilizes.

## 2021-08-18 NOTE — ASSESSMENT & PLAN NOTE
Patient presented with chest pain and SOB, was found to have tachycardia in the ER.  The chest pain was pleuritic in nature.  D/d include:  Bacterial pneumonia----No fever, WBCs not elevated  COVID pneumonia--- COVID test negative  Pulmonary Embolism---No evidence in CT-CTA chest  Pneumothorax-- low probability   Myocardial infarction --- trop t 70, EKG shows SINUS RHYTHM   FIRST DEGREE AV BLOCK     According to radiologic evidence, CT CTA Chest: Bilateral peripheral alveolar opacities are present consistent with edema or pneumonia. Differential diagnosis would include Covid 19 pneumonia.    Awaiting CT results.    Treating empirically with :  Cefdinir and doxy  Prednisone  Guaifenesin    ctm

## 2021-08-18 NOTE — DISCHARGE INSTRUCTIONS
Discharge Instructions  Dear Mr Us,  Thanks for entrusting your care in [facility]    Your were admitted after an episode of chest pain at work and concerning in the context of a COVID infection 1 month ago. On admission, it did not appear that this was a heart attack or a blot clot in your lungs.     We were worried about a possible ongoing infection, and on imaging done, it was difficult to fully differentiate that from possible remnant of your last COVID infection in the lungs. We started on antibiotics as a conservative measure. At discharge we recommend to continue that along with a short of steroids for few more days    We were also concerned about your borderline low blood pressure on admission, and we stopped your hydrochlorothiazide and reduced the amount of losartan. If at home your blood pressure is persistently above SBP of 140, please start taking the hydrochlorothiazide again. It will also be important to keep a log of your blood pressure in the morning and evening so that your primary care doctor can make the right adjustment to your regimen.    We also noticed that in our file, you do not have a primary care doctor. We attached [facility] internal medicine clinic information, and we highly recommend a follow up with a physician in the next 14 days. If you already have a primary care doctor, please follow up with him/her within 14 days of your discharge.    Again thanks for coming to us for your care.    We wish you a continuous recovery and the best of luck    Your [facility] Healthcare team     Follow-up With  Details  Why  Contact Info   [facility]-Internal Medicine  Call  Please call [facility] Internal Medicine to establish with a Primary Care Provider. Thank you.  [address] [facility] [address]  [phone]           Discharged to home by car with relative. Discharged via wheelchair, hospital escort: Yes.  Special equipment needed: Not Applicable    Be sure to schedule a follow-up  appointment with your primary care doctor or any specialists as instructed.     Discharge Plan:   Diet Plan: Discussed  Activity Level: Discussed  Confirmed Follow up Appointment: Patient to Call and Schedule Appointment  Confirmed Symptoms Management: Discussed  Medication Reconciliation Updated: Yes    I understand that a diet low in cholesterol, fat, and sodium is recommended for good health. Unless I have been given specific instructions below for another diet, I accept this instruction as my diet prescription.   Other diet: heart healthy    Special Instructions: None    · Is patient discharged on Warfarin / Coumadin?   No     Depression / Suicide Risk    As you are discharged from this [facility], it is important to learn how to keep safe from harming yourself.    Recognize the warning signs:  · Abrupt changes in personality, positive or negative- including increase in energy   · Giving away possessions  · Change in eating patterns- significant weight changes-  positive or negative  · Change in sleeping patterns- unable to sleep or sleeping all the time   · Unwillingness or inability to communicate  · Depression  · Unusual sadness, discouragement and loneliness  · Talk of wanting to die  · Neglect of personal appearance   · Rebelliousness- reckless behavior  · Withdrawal from people/activities they love  · Confusion- inability to concentrate     If you or a loved one observes any of these behaviors or has concerns about self-harm, here's what you can do:  · Talk about it- your feelings and reasons for harming yourself  · Remove any means that you might use to hurt yourself (examples: pills, rope, extension cords, firearm)  · Get professional help from the community (Mental Health, Substance Abuse, psychological counseling)  · Do not be alone:Call your Safe Contact- someone whom you trust who will be there for you.  · Call your local CRISIS HOTLINE 518-8123 or 602-637-2700  · Call your local  Children's Mobile Crisis Response Team [place] (452) 787-5293 or www.ExploraMed.Sensdata  · Call the toll free National Suicide Prevention Hotlines   · National Suicide Prevention Lifeline 745-555-LCHC (6276)  · [facility] Hope [facility] Network 800-SUICIDE (653-2474)    Your Blood pressure has been towards the lower side. We have discontinued one of your blood pressure medicines for now (hydrochlorothiazide) and Have decreased the dose of the other (cozaar).   Please keep a log of your blood pressure and if the systolic blood pressure is consistently above 140, begin hydrochlorothiazide.  Please follow up with the primary care physician for medicine reconciliation.    We have began antibiotics, please complete the course of your antibiotics.    2 weeks of Omeprazole has been added to help with any gastrointestinal contribution to your chest pain. Please complete the 2 weeks of omeprazole and report back to your PCP.    The scheduling will transfer you the information on how to get in contact with the [facility] outpatient clinic for follow up.    If at any time, your symptoms worsen (chest pain, difficulty breathing), please report back to the ER.    Chest Pain, Nonspecific  It is often hard to give a specific diagnosis for the cause of chest pain. There is always a chance that your pain could be related to something serious, like a heart attack or a blood clot in the lungs. You need to follow up with your caregiver for further evaluation. More lab tests or other studies such as X-rays, electrocardiography, stress testing, or cardiac imaging may be needed to find the cause of your pain.  Most of the time, nonspecific chest pain improves within 2 to 3 days with rest and mild pain medicine. For the next few days, avoid physical exertion or activities that bring on pain. Do not smoke. Avoid drinking alcohol. Call your caregiver for routine follow-up as advised.   SEEK IMMEDIATE MEDICAL CARE IF:  · You develop  increased chest pain or pain that radiates to the arm, neck, jaw, back, or abdomen.   · You develop shortness of breath, increased coughing, or you start coughing up blood.   · You have severe back or abdominal pain, nausea, or vomiting.   · You develop severe weakness, fainting, fever, or chills.   Document Released: 12/18/2006 Document Revised: 03/11/2013 Document Reviewed: 06/06/2008  ExitCare® Patient Information ©2013 [employer].  Chest Pain Observation  It is often hard to give a specific diagnosis for the cause of chest pain. Among other possibilities your symptoms might be caused by inadequate oxygen delivery to your heart (angina). Angina that is not treated or evaluated can lead to a heart attack (myocardial infarction) or death.  Blood tests, electrocardiograms, and X-rays may have been done to help determine a possible cause of your chest pain. After evaluation and observation, your health care provider has determined that it is unlikely your pain was caused by an unstable condition that requires hospitalization. However, a full evaluation of your pain may need to be completed, with additional diagnostic testing as directed. It is very important to keep your follow-up appointments. Not keeping your follow-up appointments could result in permanent heart damage, disability, or death. If there is any problem keeping your follow-up appointments, you must call your health care provider.  HOME CARE INSTRUCTIONS   Due to the slight chance that your pain could be angina, it is important to follow your health care provider's treatment plan and also maintain a healthy lifestyle:  · Maintain or work toward achieving a healthy weight.  · Stay physically active and exercise regularly.  · Decrease your salt intake.  · Eat a balanced, healthy diet. Talk to a dietitian to learn about heart-healthy foods.  · Increase your fiber intake by including whole grains, vegetables, fruits, and nuts in your diet.  · Avoid  situations that cause stress, anger, or depression.  · Take medicines as advised by your health care provider. Report any side effects to your health care provider. Do not stop medicines or adjust the dosages on your own.  · Quit smoking. Do not use nicotine patches or gum until you check with your health care provider.  · Keep your blood pressure, blood sugar, and cholesterol levels within normal limits.  · Limit alcohol intake to no more than 1 drink per day for women who are not pregnant and 2 drinks per day for men.  · Do not abuse drugs.  SEEK IMMEDIATE MEDICAL CARE IF:  You have severe chest pain or pressure which may include symptoms such as:  · You feel pain or pressure in your arms, neck, jaw, or back.  · You have severe back or abdominal pain, feel sick to your stomach (nauseous), or throw up (vomit).  · You are sweating profusely.  · You are having a fast or irregular heartbeat.  · You feel short of breath while at rest.  · You notice increasing shortness of breath during rest, sleep, or with activity.  · You have chest pain that does not get better after rest or after taking your usual medicine.  · You wake from sleep with chest pain.  · You are unable to sleep because you cannot breathe.  · You develop a frequent cough or you are coughing up blood.  · You feel dizzy, faint, or experience extreme fatigue.  · You develop severe weakness, dizziness, fainting, or chills.  Any of these symptoms may represent a serious problem that is an emergency. Do not wait to see if the symptoms will go away. Call your local emergency services (911 in the [place] Do not drive yourself to the hospital.  MAKE SURE YOU:  · Understand these instructions.  · Will watch your condition.  · Will get help right away if you are not doing well or get worse.     This information is not intended to replace advice given to you by your health care provider. Make sure you discuss any questions you have with your health care provider.      Document Released: 01/20/2012 Document Revised: 12/23/2014 Document Reviewed: 06/19/2014  [employer] Patient Education ©2016 [employer]      Chest Wall Pain  Chest wall pain is pain in or around the bones and muscles of your chest. Chest wall pain may be caused by:  · An injury.  · Coughing a lot.  · Using your chest and arm muscles too much.  Sometimes, the cause may not be known. This pain may take a few weeks or longer to get better.  Follow these instructions at home:  Managing pain, stiffness, and swelling  If told, put ice on the painful area:  · Put ice in a plastic bag.  · Place a towel between your skin and the bag.  · Leave the ice on for 20 minutes, 2-3 times a day.    Activity  · Rest as told by your doctor.  · Avoid doing things that cause pain. This includes lifting heavy items.  · Ask your doctor what activities are safe for you.  General instructions    · Take over-the-counter and prescription medicines only as told by your doctor.  · Do not use any products that contain nicotine or tobacco, such as cigarettes, e-cigarettes, and chewing tobacco. If you need help quitting, ask your doctor.  · Keep all follow-up visits as told by your doctor. This is important.  Contact a doctor if:  · You have a fever.  · Your chest pain gets worse.  · You have new symptoms.  Get help right away if:  · You feel sick to your stomach (nauseous) or you throw up (vomit).  · You feel sweaty or light-headed.  · You have a cough with mucus from your lungs (sputum) or you cough up blood.  · You are short of breath.  These symptoms may be an emergency. Do not wait to see if the symptoms will go away. Get medical help right away. Call your local emergency services (911 in the [place] Do not drive yourself to the hospital.  Summary  · Chest wall pain is pain in or around the bones and muscles of your chest.  · It may be treated with ice, rest, and medicines. Your condition may also get better if you avoid doing  things that cause pain.  · Contact a doctor if you have a fever, chest pain that gets worse, or new symptoms.  · Get help right away if you feel light-headed or you get short of breath. These symptoms may be an emergency.  This information is not intended to replace advice given to you by your health care provider. Make sure you discuss any questions you have with your health care provider.  Document Released: 06/05/2009 Document Revised: 06/20/2019 Document Reviewed: 06/20/2019  Elsevier Patient Education © 2020 [employer] Inc.

## 2021-08-18 NOTE — PROGRESS NOTES
Bedside report received from dayshift RN; assumed pt care. Pt assessment complete. Pt Aox4. Reviewed plan of care with pt. Pt is Telebox on and rhythm verified. Chart and labs reviewed. Bed in lowest position and two side rails up. Pt educated on call light: call light within reach.

## 2021-08-18 NOTE — H&P
History & Physical Note    Date of Admission: 8/17/2021  Admission Status: Inpatient  [facility] Team: [facility] IM White Team  Attending: Jesse Jarrett M.D.   Senior Resident: Dr. Jennifer Duong  Intern: Dr. Radames Cardenas  Contact Number: [phone]    Chief Complaint: Chest pain, SOB    History of Present Illness (HPI):   Khadar is a 75 y.o. male who presented 8/17/2021 with chest pain and difficulty breathing. This was associated with weakness, shaky feeling and hot & cold sweats. The pain was in the center of his chest and across the shoulders and upper back. It did not radiate to his arms. There is no associated headache, belly pain and no association of the pain with food or position. The pain increases on deep inspiration. He did not notice any other alleviating or exacerbating factors. He does not have any pain or swelling in the lower extremities. He was admitted to the [facility] with COVID pneumonia about a month ago and was discharged on 4 L of oxygen. Since then he has had a few episodes of similar chest pain but they were less severe and settled with adjusting his oxygen and sitting down. This episode, however, was severe and persistent, occurred while he was at work. His oxygen was decreased to 3 L last week.    Review of Systems:   Review of Systems   Constitutional: Positive for diaphoresis. Negative for chills and fever.   HENT: Negative for congestion and hearing loss.    Eyes: Negative for pain.   Respiratory: Negative for cough and wheezing.    Cardiovascular: Positive for chest pain and palpitations. Negative for orthopnea, leg swelling and PND.   Gastrointestinal: Negative for abdominal pain.   Musculoskeletal: Negative for myalgias.   Skin: Negative for itching and rash.   Neurological: Negative for headaches.   .  Past Medical History:   Past Medical History was reviewed with patient.   has a past medical history of Gout, Heart murmur, and Skull fracture (HCC). He also has prostate  cancer.    Past Surgical History: Past Surgical History was reviewed with patient.   has a past surgical history that includes other orthopedic surgery; other; and hemorrhoidectomy.    Medications: Medications have been reviewed with patient.  Prior to Admission Medications   Prescriptions Last Dose Informant Patient Reported? Taking?   Ascorbic Acid (VITAMIN C) 1000 MG Tab 8/17/2021 at Unknown time  Yes Yes   Sig: Take 1,000 mg by mouth every day.   Boswellia Juan Jose Extract Powder 8/17/2021 at Unknown time  Yes Yes   Sig: Take 1 Tablet by mouth every day.   Calcium 600 MG Tab 8/17/2021 at Unknown time  Yes Yes   Sig: Take 600 mg by mouth every day.   Coenzyme Q10 (COQ-10) 150 MG Cap 8/17/2021 at Unknown time  Yes Yes   Sig: Take 300 mg by mouth every day.   MAGNESIUM PO 8/17/2021 at Unknown time Patient Yes No   Sig: Take 1 Tab by mouth every day.   Omega-3 Fatty Acids (FISH OIL TRIPLE STRENGTH) 1400 MG Cap 8/17/2021 at Unknown time  Yes Yes   Sig: Take 1,400 mg by mouth every day.   Probiotic Cap 8/17/2021 at am  Yes Yes   Sig: Take 1 Capsule by mouth 2 times a day.   Turmeric 500 MG Tab 8/17/2021 at Unknown time Patient Yes No   Sig: Take 2 Tablets by mouth every morning.   Vitamin A 2400 MCG (8000 UT) Tab 8/17/2021 at am  Yes Yes   Sig: Take 2,400 mcg by mouth 2 times a day.   Zinc 50 MG Tab 8/17/2021 at Unknown time  Yes Yes   Sig: Take 50 mg by mouth every day.   allopurinol (ZYLOPRIM) 100 MG Tab 8/17/2021 at am Patient Yes No   Sig: Take 100 mg by mouth 2 times a day.   atorvastatin (LIPITOR) 20 MG Tab 8/16/2021 at Unknown time Patient Yes No   Sig: Take 20 mg by mouth at bedtime.   gabapentin (NEURONTIN) 300 MG Cap 8/17/2021 at am Patient Yes No   Sig: Take 300 mg by mouth 2 times a day.   hydrochlorothiazide (MICROZIDE) 12.5 MG capsule 8/17/2021 at Unknown time  Yes Yes   Sig: Take 12.5 mg by mouth every morning.   losartan (COZAAR) 100 MG Tab 8/17/2021 at Unknown time  Yes Yes   Sig: Take 100 mg by mouth  "every morning.   tamsulosin (FLOMAX) 0.4 MG capsule 8/17/2021 at Unknown time  Yes Yes   Sig: Take 0.4 mg by mouth every morning.   therapeutic multivitamin-minerals (THERAGRAN-M) Tab 8/17/2021 at Unknown time Patient Yes No   Sig: Take 1 Tablet by mouth every day.   vitamin D (CHOLECALCIFEROL) 1000 UNIT Tab 8/17/2021 at am Patient Yes No   Sig: Take 1,000 Units by mouth 2 times a day.   vitamin E 180 MG (400 UNIT) Cap 8/17/2021 at Unknown time  Yes Yes   Sig: Take 180 mg by mouth every day.      Facility-Administered Medications: None        Allergies: Allergies have been reviewed with patient.  Allergies   Allergen Reactions   • Contrast Media With Iodine [Iodine] Anaphylaxis   • Sulfa Drugs Unspecified     \"Does not work, make the infection worst\".         Family History:   family history is not on file. He does not know any family history.     Social History:   Tobacco: Yes, quit 40 years ago (2 packs/day for around 18 years)  Alcohol: Yes, occasional  Recreational drugs (illegal and prescription):  Negative  Employment:  Activity Level:    Living situation:    Recent travel:    Primary Care Provider: reviewed Pcp Not In Computer  Other (stressors, spirituality, exposures):    Physical Exam:   Vitals:  Temp:  [36.3 °C (97.4 °F)-36.9 °C (98.4 °F)] 36.3 °C (97.4 °F)  Pulse:  [] 88  Resp:  [14-24] 17  BP: ()/(51-70) 133/70  SpO2:  [89 %-99 %] 92 %    Physical Exam  Vitals and nursing note reviewed.   Constitutional:       General: He is not in acute distress.     Appearance: Normal appearance. He is normal weight. He is not ill-appearing or toxic-appearing.   HENT:      Head: Normocephalic and atraumatic.      Right Ear: External ear normal.      Left Ear: External ear normal.      Nose: Nose normal. No congestion or rhinorrhea.      Mouth/Throat:      Mouth: Mucous membranes are moist.      Pharynx: No oropharyngeal exudate.   Eyes:      General:         Right eye: No discharge.         Left eye: No " discharge.   Cardiovascular:      Rate and Rhythm: Regular rhythm.      Pulses: Normal pulses.      Heart sounds: Normal heart sounds.      Comments: Tachycardia  Pulmonary:      Effort: Pulmonary effort is normal. No respiratory distress.      Comments: Breath sounds symmetrically decreased throughout  Abdominal:      General: Bowel sounds are normal.      Palpations: Abdomen is soft.      Tenderness: There is no abdominal tenderness. There is no guarding.   Musculoskeletal:         General: No swelling.      Cervical back: Normal range of motion.   Skin:     General: Skin is warm.      Capillary Refill: Capillary refill takes less than 2 seconds.   Neurological:      Mental Status: He is alert and oriented to person, place, and time.         Labs:   Recent Labs     21  1135   WBC 7.6   RBC 2.90*   HEMOGLOBIN 9.3*   HEMATOCRIT 27.5*   MCV 94.8   MCH 32.1   RDW 45.1   PLATELETCT 122*   MPV 10.2   NEUTSPOLYS 69.10   LYMPHOCYTES 16.20*   MONOCYTES 9.60   EOSINOPHILS 3.70   BASOPHILS 0.70   '  Recent Labs     21  1135   SODIUM 137   POTASSIUM 3.7   CHLORIDE 102   CO2 19*   GLUCOSE 131*   BUN 43*         EK2021 1348  SINUS RHYTHM   FIRST DEGREE AV BLOCK   QTc 464  2021 1138  Twelve-lead EKG shows a sinus tachycardia with a ventricular rate of 108, T   wave   inversion in lead III and flattened aVF, no ST segment elevation or   depression,   occasional premature atrial contractions    Imaging:   CAT SCAN 2021  1.  No evidence of pulmonary embolism.     2.  Bilateral peripheral alveolar opacities are present consistent with edema or pneumonia. Differential diagnosis would include Covid 19 pneumonia.    Dx-CHEST-PORTABLE  1.  Bilateral patchy alveolar opacities are present which represent alveolar edema or pneumonia. Differential diagnosis would include Covid 19 pneumonia.     2.  No lobar consolidation.         Previous Data Review: reviewed    Problem Representation:  74 yo male with COVID  pneumonia 1 month ago, presented today with chest pain, SOB and tachycardia accompanied by diaphoresis. The heart rate was elevated around 110. His pain was pleuritic in nature, localized around the mid chest, shoulders and upper back. No radiation to arms, it does not increase with chest pressure/palpation. Chest x-ray is consistent with pneumonia. CT angiogram was not done due to his reaction to contrast in the past.   The patient also has probable acute renal insufficiency as his BUN and creatinine are elevated (don't know the baseline, awaiting labs from [facility]). Anemia also present, no source of bleeding identified. The patient's troponin is slightly elevated but this is probably from his renal sufficiency as his EKG does not support ischemia. This cannot be fully excluded. Please see the problem list below.     Chest pain  Assessment & Plan  Patient presented with chest pain and SOB, was found to have tachycardia in the ER.  The chest pain was pleuritic in nature.  D/d include:  Bacterial pneumonia----No fever, WBCs not elevated  COVID pneumonia--- COVID test negative  Pulmonary Embolism---No evidence in CT-CTA chest  Pneumothorax-- low probability   Myocardial infarction --- trop t 70, EKG shows SINUS RHYTHM   FIRST DEGREE AV BLOCK     According to radiologic evidence, CT CTA Chest: Bilateral peripheral alveolar opacities are present consistent with edema or pneumonia. Differential diagnosis would include Covid 19 pneumonia.    Awaiting CT results.    Treating empirically with :  Cefdinir and doxy  Prednisone  Guaifenesin    ctm    Acute kidney injury (HCC)  Assessment & Plan  Patients Cr was 1.99 on presentation,   GFR 33.     His baseline Cr level is unknown, will try to get the records from [facility].  -Avoid nephrotoxic meds  -Fluid resuscitation  -ctm    Hypotension  Assessment & Plan  Patients BP was low and he was tachycardiac on presentation.  Holding BP meds for now.  Fluid resuscitation  as required  ctm    Anemia  Assessment & Plan  His hb is decreased  MCV 94.8  Could be chronic anemia  Vit b12, iron, ferritin- pending results  ctm  F/u outpatient      Hypertension  Assessment & Plan  Patient has a history of HTN.  His B.P was low on presentation,  Holding his home meds for hypertension till his bp stabilizes.      Prostate CA (HCC)  Assessment & Plan  Patient says he has a history of prostate cancer.  He also gets urinary retention for which he takes tamsulosin.  Will continue tamsulosin    Hyperlipidemia  Assessment & Plan  Continue atorvastatin    Gout  Assessment & Plan  Stable, continue allopurinol.

## 2021-08-18 NOTE — ASSESSMENT & PLAN NOTE
Patient says he has a history of prostate cancer.  He also gets urinary retention for which he takes tamsulosin.  Will continue tamsulosin

## 2021-08-18 NOTE — ASSESSMENT & PLAN NOTE
Patients Cr was 1.99 on presentation,   GFR 33.     His baseline Cr level is unknown, will try to get the records from [facility].  -Avoid nephrotoxic meds  -Fluid resuscitation  -ctm

## 2021-08-18 NOTE — ASSESSMENT & PLAN NOTE
Patients BP was low and he was tachycardiac on presentation.  Holding BP meds for now.  Fluid resuscitation as required  ctm

## 2021-08-18 NOTE — PROGRESS NOTES
Patient up to floor with this ACLS RN, patient aa&ox4, VSS on room air, no c/o pain. Patient placed in tele monitor, techs notified rhythm visualized. Patient oriented to room and call light, call light placed with reach. Patient with steady gain, up ad domingo.

## 2021-09-06 NOTE — DOCUMENTATION QUERY
"                                                                         [facility]                                                                       Query Response Note      PATIENT:               LUIS LEWIS  ACCT #:                  [id number]  MRN:                     [id number]  :                      1945  ADMIT DATE:       2021 11:27 AM  DISCH DATE:        2021 1:23 PM  RESPONDING  PROVIDER #:        [id number]           QUERY TEXT:    Please clarify the current status of the patient?s COVID-19 infection.    NOTE:  If an appropriate response is not listed below, please respond with a new note.        The patient's clinical indicators include:  *  Per ED note, patient with  \" Covid Pneumonia with hypoxemia \"     * Per H&P , CTA done by ER and reviewed, no PE but has b/l significant infiltrates and small consolidations which could be new vs   residual PNA  *  Unclear cause of his symptoms, most likely COVID sequele but will go ahead and treat possible PNA and pleuritis with oral ABX and steroids  Treatment/ Monitoring:   * 2 Liters Oxygen   * Steroids, Cefdinir 300 mg   Risk Factors:   * Patient with RADHA   * Patient oxygen dependent since initial diagnosis of Covid one month prior.     Respectfully submitted [email or web address]  Options provided:   -- COVID-19 is not a current infection   -- COVID-19 continues to be a current and active infection   -- COVID 19 is current and asymptomatic   -- [place] of Covid 19   -- Clinically unable to determine the current status of the patient?s COVID-19 infection      Query created by: Rosa Arevalo on 2021 7:26 AM    RESPONSE TEXT:    COVID-19 is not a current infection          Electronically signed by:  ALYSON STEINER MD 2021 1:52 PM              "

## 2024-11-26 ENCOUNTER — APPOINTMENT (RX ONLY)
Dept: URBAN - METROPOLITAN AREA CLINIC 4 | Facility: CLINIC | Age: 79
Setting detail: DERMATOLOGY
End: 2024-11-26

## 2024-11-26 DIAGNOSIS — D49.2 NEOPLASM OF UNSPECIFIED BEHAVIOR OF BONE, SOFT TISSUE, AND SKIN: ICD-10-CM

## 2024-11-26 DIAGNOSIS — F42.4 EXCORIATION (SKIN-PICKING) DISORDER: ICD-10-CM

## 2024-11-26 DIAGNOSIS — L20.89 OTHER ATOPIC DERMATITIS: ICD-10-CM

## 2024-11-26 PROBLEM — D48.5 NEOPLASM OF UNCERTAIN BEHAVIOR OF SKIN: Status: ACTIVE | Noted: 2024-11-26

## 2024-11-26 PROCEDURE — ? COUNSELING

## 2024-11-26 PROCEDURE — 99203 OFFICE O/P NEW LOW 30 MIN: CPT | Mod: 25

## 2024-11-26 PROCEDURE — 11103 TANGNTL BX SKIN EA SEP/ADDL: CPT

## 2024-11-26 PROCEDURE — 11102 TANGNTL BX SKIN SINGLE LES: CPT

## 2024-11-26 PROCEDURE — ? DEFER

## 2024-11-26 PROCEDURE — ? ADDITIONAL NOTES

## 2024-11-26 PROCEDURE — ? BIOPSY BY SHAVE METHOD

## 2024-11-26 PROCEDURE — ? PRESCRIPTION MEDICATION MANAGEMENT

## 2024-11-26 ASSESSMENT — LOCATION SIMPLE DESCRIPTION DERM
LOCATION SIMPLE: RIGHT FOREARM
LOCATION SIMPLE: LEFT ZYGOMA
LOCATION SIMPLE: RIGHT FOREHEAD
LOCATION SIMPLE: LEFT FOREARM

## 2024-11-26 ASSESSMENT — LOCATION DETAILED DESCRIPTION DERM
LOCATION DETAILED: RIGHT SUPERIOR FOREHEAD
LOCATION DETAILED: LEFT LATERAL ZYGOMA
LOCATION DETAILED: LEFT VENTRAL PROXIMAL FOREARM
LOCATION DETAILED: RIGHT VENTRAL PROXIMAL FOREARM

## 2024-11-26 ASSESSMENT — LOCATION ZONE DERM
LOCATION ZONE: FACE
LOCATION ZONE: ARM

## 2024-11-26 NOTE — PROCEDURE: BIOPSY BY SHAVE METHOD
Detail Level: Detailed
Depth Of Biopsy: dermis
Was A Bandage Applied: Yes
Size Of Lesion In Cm: 0
Biopsy Type: H and E
Biopsy Method: Dermablade
Anesthesia Type: 1% lidocaine without epinephrine
Anesthesia Volume In Cc: 0.5
Hemostasis: Drysol
Wound Care: Petrolatum
Dressing: bandage
Destruction After The Procedure: No
Type Of Destruction Used: Curettage
Curettage Text: The wound bed was treated with curettage after the biopsy was performed.
Cryotherapy Text: The wound bed was treated with cryotherapy after the biopsy was performed.
Electrodesiccation Text: The wound bed was treated with electrodesiccation after the biopsy was performed.
Electrodesiccation And Curettage Text: The wound bed was treated with electrodesiccation and curettage after the biopsy was performed.
Silver Nitrate Text: The wound bed was treated with silver nitrate after the biopsy was performed.
Lab: [id number]
Lab Facility: 
Path Notes (To The Dermatopathologist): Portion of larger lesion
Consent: Written consent was obtained and risks were reviewed including but not limited to scarring, infection, bleeding, scabbing, incomplete removal, nerve damage and allergy to anesthesia.
Post-Care Instructions: I reviewed with the patient in detail post-care instructions. Patient is to keep the biopsy site dry overnight, and then apply bacitracin twice daily until healed. Patient may apply hydrogen peroxide soaks to remove any crusting.
Notification Instructions: Patient will be notified of biopsy results. However, patient instructed to call the office if not contacted within 2 weeks.
Billing Type: Third-Party Bill
Information: Selecting Yes will display possible errors in your note based on the variables you have selected. This validation is only offered as a suggestion for you. PLEASE NOTE THAT THE VALIDATION TEXT WILL BE REMOVED WHEN YOU FINALIZE YOUR NOTE. IF YOU WANT TO FAX A PRELIMINARY NOTE YOU WILL NEED TO TOGGLE THIS TO 'NO' IF YOU DO NOT WANT IT IN YOUR FAXED NOTE.

## 2025-08-05 ENCOUNTER — APPOINTMENT (OUTPATIENT)
Dept: URBAN - METROPOLITAN AREA CLINIC 4 | Facility: CLINIC | Age: 80
Setting detail: DERMATOLOGY
End: 2025-08-05

## 2025-08-05 DIAGNOSIS — L82.1 OTHER SEBORRHEIC KERATOSIS: ICD-10-CM

## 2025-08-05 PROCEDURE — ? COUNSELING

## 2025-08-05 PROCEDURE — ? DIAGNOSIS COMMENT

## 2025-08-05 ASSESSMENT — LOCATION DETAILED DESCRIPTION DERM: LOCATION DETAILED: RIGHT NASAL ALA

## 2025-08-05 ASSESSMENT — LOCATION SIMPLE DESCRIPTION DERM: LOCATION SIMPLE: RIGHT NOSE

## 2025-08-05 ASSESSMENT — LOCATION ZONE DERM: LOCATION ZONE: NOSE
